# Patient Record
Sex: MALE | Race: WHITE | NOT HISPANIC OR LATINO | Employment: OTHER | URBAN - METROPOLITAN AREA
[De-identification: names, ages, dates, MRNs, and addresses within clinical notes are randomized per-mention and may not be internally consistent; named-entity substitution may affect disease eponyms.]

---

## 2018-07-31 ENCOUNTER — OFFICE VISIT (OUTPATIENT)
Dept: INTERNAL MEDICINE | Facility: MEDICAL CENTER | Age: 58
End: 2018-07-31
Payer: COMMERCIAL

## 2018-07-31 VITALS
WEIGHT: 315 LBS | TEMPERATURE: 97.6 F | BODY MASS INDEX: 45.1 KG/M2 | HEIGHT: 70 IN | RESPIRATION RATE: 18 BRPM | SYSTOLIC BLOOD PRESSURE: 144 MMHG | DIASTOLIC BLOOD PRESSURE: 90 MMHG | HEART RATE: 102 BPM | OXYGEN SATURATION: 96 %

## 2018-07-31 DIAGNOSIS — K21.9 GASTROESOPHAGEAL REFLUX DISEASE WITHOUT ESOPHAGITIS: ICD-10-CM

## 2018-07-31 DIAGNOSIS — G47.33 OBSTRUCTIVE SLEEP APNEA SYNDROME: ICD-10-CM

## 2018-07-31 DIAGNOSIS — I10 ESSENTIAL HYPERTENSION, BENIGN: ICD-10-CM

## 2018-07-31 DIAGNOSIS — E11.9 TYPE 2 DIABETES MELLITUS WITHOUT COMPLICATION, WITHOUT LONG-TERM CURRENT USE OF INSULIN (HCC): ICD-10-CM

## 2018-07-31 DIAGNOSIS — E66.01 CLASS 3 SEVERE OBESITY DUE TO EXCESS CALORIES WITHOUT SERIOUS COMORBIDITY WITH BODY MASS INDEX (BMI) OF 50.0 TO 59.9 IN ADULT (HCC): ICD-10-CM

## 2018-07-31 DIAGNOSIS — R41.3 MEMORY DYSFUNCTION: ICD-10-CM

## 2018-07-31 DIAGNOSIS — E78.5 DYSLIPIDEMIA: ICD-10-CM

## 2018-07-31 DIAGNOSIS — E66.01 MORBID OBESITY WITH BMI OF 50.0-59.9, ADULT (HCC): ICD-10-CM

## 2018-07-31 DIAGNOSIS — R07.89 OTHER CHEST PAIN: ICD-10-CM

## 2018-07-31 DIAGNOSIS — F41.9 ANXIETY: ICD-10-CM

## 2018-07-31 DIAGNOSIS — A05.1: ICD-10-CM

## 2018-07-31 PROBLEM — E66.9 OBESITY: Status: ACTIVE | Noted: 2018-07-31

## 2018-07-31 PROCEDURE — 99204 OFFICE O/P NEW MOD 45 MIN: CPT | Mod: GC | Performed by: INTERNAL MEDICINE

## 2018-07-31 RX ORDER — ESOMEPRAZOLE MAGNESIUM 40 MG/1
40 CAPSULE, DELAYED RELEASE ORAL
Qty: 90 CAP | Refills: 1 | Status: SHIPPED | OUTPATIENT
Start: 2018-07-31 | End: 2018-09-07 | Stop reason: SDUPTHER

## 2018-07-31 RX ORDER — ATORVASTATIN CALCIUM 20 MG/1
20 TABLET, FILM COATED ORAL NIGHTLY
COMMUNITY
End: 2018-09-07 | Stop reason: SDUPTHER

## 2018-07-31 RX ORDER — VALSARTAN AND HYDROCHLOROTHIAZIDE 320; 12.5 MG/1; MG/1
1 TABLET, FILM COATED ORAL DAILY
COMMUNITY
End: 2018-09-07 | Stop reason: SDUPTHER

## 2018-07-31 RX ORDER — FLUOXETINE HYDROCHLORIDE 40 MG/1
40 CAPSULE ORAL DAILY
COMMUNITY
Start: 2018-07-31 | End: 2018-09-07 | Stop reason: SDUPTHER

## 2018-07-31 ASSESSMENT — PAIN SCALES - GENERAL: PAINLEVEL: NO PAIN

## 2018-08-01 PROBLEM — E66.01 MORBID OBESITY WITH BMI OF 50.0-59.9, ADULT (HCC): Status: ACTIVE | Noted: 2018-08-01

## 2018-08-01 NOTE — PROGRESS NOTES
New Patient to Establish    Reason to establish: New patient to establish    CC: Hypertension, memory loss, diabetes mellitus type 2, dyslipidemia    HPI: Mrs. Harmon is a very pleasant 58-year-old male with past medical history significant for botulism back in 2007 had a very prolonged hospital course back then had a tracheostomy and PEG tube eventually recovered with significant memory deficits, hypertension, dyslipidemia, obstructive sleep apnea, dyslipidemia, diabetes mellitus type 2, GERD presents to the clinic to establish care after his previous primary care physician close down practice. His previous primary care physician was Dr. Georgette Madsen.    The patient states that he is doing fine, has no complaints but during interaction with patient, he is clearly having memory issues. Discussed with wife, patient is unable to leave home secondary to anxiety from his memory issues. Dr. Rodriguez evaluated him at that time and noted him to have severe memory loss for both verbal and nonverbal information. 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    His diabetes mellitus type 2, patient's wife reports that his last A1c was done in January which was labs were done in January and at that time his A1c was between 7 and 8. She is not aware of any otherwise abnormal labs.      Patient Active Problem List    Diagnosis Date Noted   • GERD (gastroesophageal reflux disease) 07/31/2018   • Type 2 diabetes mellitus (HCC) 12/04/2012   • Dyslipidemia 12/04/2012   • Essential hypertension, benign 12/04/2012   • Memory dysfunction 12/04/2012   • Botulism 12/04/2012   • Sleep apnea 12/04/2012       Past Medical History:   Diagnosis Date   • Diabetes    • Hyperlipidemia    • Hypertension        Current Outpatient Prescriptions   Medication Sig Dispense Refill   • valsartan-hydrochlorothiazide (DIOVAN-HCT) 320-12.5 MG per tablet Take 1 Tab by mouth every  day.     • linagliptin (TRADJENTA) 5 MG Tab tablet Take 5 mg by mouth every day.     • atorvastatin (LIPITOR) 20 MG Tab Take 20 mg by mouth every evening.     • metformin (GLUCOPHAGE) 1000 MG tablet Take 1 Tab by mouth 2 times a day, with meals. 180 Tab 1   • esomeprazole (NEXIUM) 40 MG delayed-release capsule Take 1 Cap by mouth every morning before breakfast. 90 Cap 1   • fluoxetine (PROZAC) 20 MG CAPS Take 20 mg by mouth every day.         No current facility-administered medications for this visit.        Allergies as of 07/31/2018   • (No Known Allergies)       Social History     Social History   • Marital status:      Spouse name: N/A   • Number of children: N/A   • Years of education: N/A     Occupational History   • Not on file.     Social History Main Topics   • Smoking status: Former Smoker     Packs/day: 0.50     Types: Cigarettes     Quit date: 12/4/1980   • Smokeless tobacco: Former User     Types: Chew     Quit date: 12/4/1980      Comment: NOT REALLY SURE WHAT YEAR HE QUIT SMOKING & CHEW   • Alcohol use No   • Drug use: Yes     Types: Marijuana   • Sexual activity: Not on file     Other Topics Concern   • Not on file     Social History Narrative   • No narrative on file       Family History   Problem Relation Age of Onset   • Stroke Mother    • Heart Disease Father    • Cancer Neg Hx        Past Surgical History:   Procedure Laterality Date   • TRACHEOSTOMY         ROS: As per HPI. Additional pertinent symptoms as noted below.    Constitutional: Denies fevers, Denies weight changes  Eyes: Denies changes in vision, no eye pain  Ears/Nose/Throat/Mouth: Denies nasal congestion or sore throat   Cardiovascular: Denies chest pain or palpitations   Respiratory: Denies shortness of breath , Denies cough  Gastrointestinal/Hepatic: Denies abdominal pain, nausea, vomiting, diarrhea, constipation or GI bleeding   Genitourinary: Denies bladder dysfunction, dysuria or frequency  Musculoskeletal/Rheum: Denies  " joint pain and swelling   Neurological: Denies headache, confusion, memory loss or focal weakness/parasthesias  Psychiatric: Reports mood disorder   Endocrine: Reports history of diabetes, denies thyroid disorder  Heme/Oncology/Lymph Nodes: Denies enlarged lymph nodes, denies brusing or known bleeding disorder  Allergic/Immunologic: Denies hx of allergies            /90   Pulse (!) 102   Temp 36.4 °C (97.6 °F)   Resp 18   Ht 1.79 m (5' 10.47\")   Wt (!) 162.6 kg (358 lb 6.4 oz)   SpO2 96%   BMI 50.74 kg/m²     Physical Exam  General:  Alert and oriented, No apparent distress.    Eyes: Pupils equal and reactive. No scleral icterus.    Throat: Clear no erythema or exudates noted.    Neck: Supple. No lymphadenopathy noted. Thyroid not enlarged.    Lungs: Clear to auscultation and percussion bilaterally.    Cardiovascular: Regular rate and rhythm. No murmurs, rubs or gallops.    Abdomen:  Benign. No rebound or guarding noted.    Extremities: No clubbing, cyanosis, edema.    Skin: Clear. No rash or suspicious skin lesions noted.      Note: I have reviewed all pertinent labs and diagnostic tests associated with this visit with specific comments listed under the assessment and plan below    Assessment and Plan    1. Type 2 diabetes mellitus without complication, without long-term current use of insulin (HCC)  Currently using metformin and linagliptin.  Appears to be well controlled based on A1c which was reportedly to be between 7-8.  Will get repeat labs, continue current medication.  We'll need ophthalmology follow-up and foot exam during next visit.  - BASIC METABOLIC PANEL; Future  - HEMOGLOBIN A1C; Future  - MICROALBUMIN CREAT RATIO URINE; Future  - metformin (GLUCOPHAGE) 1000 MG tablet; Take 1 Tab by mouth 2 times a day, with meals.  Dispense: 180 Tab; Refill: 1    2. Obstructive sleep apnea syndrome  Has a history of obstructive sleep apnea diagnosed previously.  Try using CPAP in the past, was not able " to tolerate it.  Strongly counseled patient about benefits of using CPAP.  We'll refer to pulmonology, which patient will follow up with.  - CBC WITH DIFFERENTIAL; Future  - REFERRAL TO PULMONOLOGY    3. Dyslipidemia  Continue atorvastatin 20 mg daily for now.  We'll recheck lipid panel.  - LIPID PROFILE; Future    4. Gastroesophageal reflux disease without esophagitis  Currently well-controlled.  Refill Nexium.  - esomeprazole (NEXIUM) 40 MG delayed-release capsule; Take 1 Cap by mouth every morning before breakfast.  Dispense: 90 Cap; Refill: 1    5. Essential hypertension, benign  Slightly elevated at 144/90.  We'll continue current medication which is valsartan hydro-chlorothiazide, for now.  Patient reports blood pressure to be elevated due to anxiety from office visit.  Advised ambulatory home blood pressure monitoring.    6. Memory dysfunction  Patient has significant memory difficulties.  Evaluated by Dr. Rodriguez in 2013 January.  No wander risk at the moment.  Did not see the possibility of memory returning after 11 years from the incident.  Encourage more activities.    7. Anxiety  Likely as result of his memory issues.  's dose of fluoxetine to 40 mg daily.  We'll reassess during next visit, as it takes 4-6 weeks for a SSRI take effect.    8. Healthcare maintenance  Colonoscopy done in September 2017, repeat colonoscopy recommended in 3 years for polyps.  Will need to get records from PCPs office for vaccine records.        Followup: Return in about 5 weeks (around 9/4/2018).      Signed by: Wilber Turner M.D.

## 2018-08-24 LAB
ALBUMIN/CREAT UR: 50.2 MG/G CREAT (ref 0–30)
BASOPHILS # BLD AUTO: 0 X10E3/UL (ref 0–0.2)
BASOPHILS NFR BLD AUTO: 0 %
BUN SERPL-MCNC: 13 MG/DL (ref 6–24)
BUN/CREAT SERPL: 14 (ref 9–20)
CALCIUM SERPL-MCNC: 9.8 MG/DL (ref 8.7–10.2)
CHLORIDE SERPL-SCNC: 103 MMOL/L (ref 96–106)
CHOLEST SERPL-MCNC: 164 MG/DL (ref 100–199)
CO2 SERPL-SCNC: 23 MMOL/L (ref 20–29)
CREAT SERPL-MCNC: 0.9 MG/DL (ref 0.76–1.27)
CREAT UR-MCNC: 234.5 MG/DL
EOSINOPHIL # BLD AUTO: 0.2 X10E3/UL (ref 0–0.4)
EOSINOPHIL NFR BLD AUTO: 2 %
ERYTHROCYTE [DISTWIDTH] IN BLOOD BY AUTOMATED COUNT: 14.2 % (ref 12.3–15.4)
GLUCOSE SERPL-MCNC: 139 MG/DL (ref 65–99)
HBA1C MFR BLD: 6.8 % (ref 4.8–5.6)
HCT VFR BLD AUTO: 45.8 % (ref 37.5–51)
HDLC SERPL-MCNC: 49 MG/DL
HGB BLD-MCNC: 15.6 G/DL (ref 13–17.7)
IF AFRICAN AMERICAN  100797: 110 ML/MIN/1.73
IF NON AFRICAN AMER 100791: 95 ML/MIN/1.73
IMM GRANULOCYTES # BLD: 0 X10E3/UL (ref 0–0.1)
IMM GRANULOCYTES NFR BLD: 0 %
IMMATURE CELLS  115398: ABNORMAL
LABORATORY COMMENT REPORT: NORMAL
LDLC SERPL CALC-MCNC: 88 MG/DL (ref 0–99)
LYMPHOCYTES # BLD AUTO: 2.3 X10E3/UL (ref 0.7–3.1)
LYMPHOCYTES NFR BLD AUTO: 23 %
MCH RBC QN AUTO: 29.2 PG (ref 26.6–33)
MCHC RBC AUTO-ENTMCNC: 34.1 G/DL (ref 31.5–35.7)
MCV RBC AUTO: 86 FL (ref 79–97)
MICROALBUMIN UR-MCNC: 117.7 UG/ML
MONOCYTES # BLD AUTO: 1.1 X10E3/UL (ref 0.1–0.9)
MONOCYTES NFR BLD AUTO: 11 %
MORPHOLOGY BLD-IMP: ABNORMAL
NEUTROPHILS # BLD AUTO: 6.7 X10E3/UL (ref 1.4–7)
NEUTROPHILS NFR BLD AUTO: 64 %
NRBC BLD AUTO-RTO: ABNORMAL %
PLATELET # BLD AUTO: 240 X10E3/UL (ref 150–379)
POTASSIUM SERPL-SCNC: 4.5 MMOL/L (ref 3.5–5.2)
RBC # BLD AUTO: 5.35 X10E6/UL (ref 4.14–5.8)
SODIUM SERPL-SCNC: 142 MMOL/L (ref 134–144)
TRIGL SERPL-MCNC: 136 MG/DL (ref 0–149)
VLDLC SERPL CALC-MCNC: 27 MG/DL (ref 5–40)
WBC # BLD AUTO: 10.3 X10E3/UL (ref 3.4–10.8)

## 2018-09-06 ENCOUNTER — APPOINTMENT (OUTPATIENT)
Dept: INTERNAL MEDICINE | Facility: MEDICAL CENTER | Age: 58
End: 2018-09-06
Payer: COMMERCIAL

## 2018-09-07 ENCOUNTER — OFFICE VISIT (OUTPATIENT)
Dept: INTERNAL MEDICINE | Facility: MEDICAL CENTER | Age: 58
End: 2018-09-07
Payer: COMMERCIAL

## 2018-09-07 VITALS
RESPIRATION RATE: 19 BRPM | HEIGHT: 70 IN | DIASTOLIC BLOOD PRESSURE: 90 MMHG | HEART RATE: 88 BPM | WEIGHT: 315 LBS | OXYGEN SATURATION: 94 % | TEMPERATURE: 97.6 F | SYSTOLIC BLOOD PRESSURE: 148 MMHG | BODY MASS INDEX: 45.1 KG/M2

## 2018-09-07 DIAGNOSIS — E78.5 DYSLIPIDEMIA: ICD-10-CM

## 2018-09-07 DIAGNOSIS — E11.9 TYPE 2 DIABETES MELLITUS WITHOUT COMPLICATION, WITHOUT LONG-TERM CURRENT USE OF INSULIN (HCC): ICD-10-CM

## 2018-09-07 DIAGNOSIS — E66.01 MORBID OBESITY WITH BMI OF 50.0-59.9, ADULT (HCC): ICD-10-CM

## 2018-09-07 DIAGNOSIS — R07.89 OTHER CHEST PAIN: ICD-10-CM

## 2018-09-07 DIAGNOSIS — I10 ESSENTIAL HYPERTENSION, BENIGN: ICD-10-CM

## 2018-09-07 DIAGNOSIS — F41.9 ANXIETY: ICD-10-CM

## 2018-09-07 DIAGNOSIS — R41.3 MEMORY DYSFUNCTION: ICD-10-CM

## 2018-09-07 DIAGNOSIS — I10 HYPERTENSION, UNSPECIFIED TYPE: ICD-10-CM

## 2018-09-07 DIAGNOSIS — R80.9 MICROALBUMINURIA: ICD-10-CM

## 2018-09-07 DIAGNOSIS — A05.1: ICD-10-CM

## 2018-09-07 DIAGNOSIS — Z23 FLU VACCINE NEED: ICD-10-CM

## 2018-09-07 DIAGNOSIS — K21.9 GASTROESOPHAGEAL REFLUX DISEASE WITHOUT ESOPHAGITIS: ICD-10-CM

## 2018-09-07 PROCEDURE — 99214 OFFICE O/P EST MOD 30 MIN: CPT | Mod: GC,25 | Performed by: INTERNAL MEDICINE

## 2018-09-07 PROCEDURE — 90471 IMMUNIZATION ADMIN: CPT | Performed by: INTERNAL MEDICINE

## 2018-09-07 PROCEDURE — 90686 IIV4 VACC NO PRSV 0.5 ML IM: CPT | Performed by: INTERNAL MEDICINE

## 2018-09-07 RX ORDER — FLUOXETINE HYDROCHLORIDE 40 MG/1
40 CAPSULE ORAL DAILY
Qty: 90 CAP | Refills: 2 | Status: SHIPPED | OUTPATIENT
Start: 2018-09-07 | End: 2019-01-25 | Stop reason: SDUPTHER

## 2018-09-07 RX ORDER — VALSARTAN AND HYDROCHLOROTHIAZIDE 320; 12.5 MG/1; MG/1
1 TABLET, FILM COATED ORAL DAILY
Qty: 30 TAB | Refills: 0 | Status: SHIPPED | OUTPATIENT
Start: 2018-09-07 | End: 2018-09-07 | Stop reason: SDUPTHER

## 2018-09-07 RX ORDER — VALSARTAN AND HYDROCHLOROTHIAZIDE 320; 12.5 MG/1; MG/1
1 TABLET, FILM COATED ORAL DAILY
Qty: 30 TAB | Refills: 0 | Status: SHIPPED | OUTPATIENT
Start: 2018-09-07 | End: 2019-01-25 | Stop reason: SDUPTHER

## 2018-09-07 RX ORDER — ESOMEPRAZOLE MAGNESIUM 40 MG/1
40 CAPSULE, DELAYED RELEASE ORAL
Qty: 90 CAP | Refills: 2 | Status: SHIPPED | OUTPATIENT
Start: 2018-09-07 | End: 2019-01-25 | Stop reason: SDUPTHER

## 2018-09-07 RX ORDER — VALSARTAN AND HYDROCHLOROTHIAZIDE 320; 12.5 MG/1; MG/1
1 TABLET, FILM COATED ORAL DAILY
Qty: 90 TAB | Refills: 2 | Status: SHIPPED | OUTPATIENT
Start: 2018-09-07 | End: 2018-09-07 | Stop reason: SDUPTHER

## 2018-09-07 RX ORDER — ATORVASTATIN CALCIUM 20 MG/1
20 TABLET, FILM COATED ORAL DAILY
Qty: 90 TAB | Refills: 2 | Status: SHIPPED | OUTPATIENT
Start: 2018-09-07 | End: 2019-01-25 | Stop reason: SDUPTHER

## 2018-09-07 ASSESSMENT — PAIN SCALES - GENERAL: PAINLEVEL: NO PAIN

## 2018-09-07 NOTE — PATIENT INSTRUCTIONS
Please measure blood pressure at home and maintain recordings.    Please follow up with dentist and pulmonology for sleep apnea.    Diabetes Mellitus and Food  It is important for you to manage your blood sugar (glucose) level. Your blood glucose level can be greatly affected by what you eat. Eating healthier foods in the appropriate amounts throughout the day at about the same time each day will help you control your blood glucose level. It can also help slow or prevent worsening of your diabetes mellitus. Healthy eating may even help you improve the level of your blood pressure and reach or maintain a healthy weight.  General recommendations for healthful eating and cooking habits include:  · Eating meals and snacks regularly. Avoid going long periods of time without eating to lose weight.  · Eating a diet that consists mainly of plant-based foods, such as fruits, vegetables, nuts, legumes, and whole grains.  · Using low-heat cooking methods, such as baking, instead of high-heat cooking methods, such as deep frying.  Work with your dietitian to make sure you understand how to use the Nutrition Facts information on food labels.  How can food affect me?  Carbohydrates   Carbohydrates affect your blood glucose level more than any other type of food. Your dietitian will help you determine how many carbohydrates to eat at each meal and teach you how to count carbohydrates. Counting carbohydrates is important to keep your blood glucose at a healthy level, especially if you are using insulin or taking certain medicines for diabetes mellitus.  Alcohol   Alcohol can cause sudden decreases in blood glucose (hypoglycemia), especially if you use insulin or take certain medicines for diabetes mellitus. Hypoglycemia can be a life-threatening condition. Symptoms of hypoglycemia (sleepiness, dizziness, and disorientation) are similar to symptoms of having too much alcohol.  If your health care provider has given you approval to  drink alcohol, do so in moderation and use the following guidelines:  · Women should not have more than one drink per day, and men should not have more than two drinks per day. One drink is equal to:  ¨ 12 oz of beer.  ¨ 5 oz of wine.  ¨ 1½ oz of hard liquor.  · Do not drink on an empty stomach.  · Keep yourself hydrated. Have water, diet soda, or unsweetened iced tea.  · Regular soda, juice, and other mixers might contain a lot of carbohydrates and should be counted.  What foods are not recommended?  As you make food choices, it is important to remember that all foods are not the same. Some foods have fewer nutrients per serving than other foods, even though they might have the same number of calories or carbohydrates. It is difficult to get your body what it needs when you eat foods with fewer nutrients. Examples of foods that you should avoid that are high in calories and carbohydrates but low in nutrients include:  · Trans fats (most processed foods list trans fats on the Nutrition Facts label).  · Regular soda.  · Juice.  · Candy.  · Sweets, such as cake, pie, doughnuts, and cookies.  · Fried foods.  What foods can I eat?  Eat nutrient-rich foods, which will nourish your body and keep you healthy. The food you should eat also will depend on several factors, including:  · The calories you need.  · The medicines you take.  · Your weight.  · Your blood glucose level.  · Your blood pressure level.  · Your cholesterol level.  You should eat a variety of foods, including:  · Protein.  ¨ Lean cuts of meat.  ¨ Proteins low in saturated fats, such as fish, egg whites, and beans. Avoid processed meats.  · Fruits and vegetables.  ¨ Fruits and vegetables that may help control blood glucose levels, such as apples, mangoes, and yams.  · Dairy products.  ¨ Choose fat-free or low-fat dairy products, such as milk, yogurt, and cheese.  · Grains, bread, pasta, and rice.  ¨ Choose whole grain products, such as multigrain bread,  whole oats, and brown rice. These foods may help control blood pressure.  · Fats.  ¨ Foods containing healthful fats, such as nuts, avocado, olive oil, canola oil, and fish.  Does everyone with diabetes mellitus have the same meal plan?  Because every person with diabetes mellitus is different, there is not one meal plan that works for everyone. It is very important that you meet with a dietitian who will help you create a meal plan that is just right for you.  This information is not intended to replace advice given to you by your health care provider. Make sure you discuss any questions you have with your health care provider.  Document Released: 09/14/2006 Document Revised: 05/25/2017 Document Reviewed: 11/14/2014  [employer] Patient Education © 2017 [employer]

## 2018-09-07 NOTE — PROGRESS NOTES
Ramirez Harmon is a 58 y.o. male here for a non-provider visit for: INFLUENZA VACCINE      Reason for immunization: Annual Flu Vaccine  Immunization records indicate need for vaccine: Yes, confirmed with [facility]  Minimum interval has been met for this vaccine: Yes  ABN completed: Not Indicated    Order and dose verified by: JULIANO GOSS Dated  08/07/2015 was given to patient: Yes  IAC Questionnaire abnormal.  Questionnaire reviewed and administration of injection approved by provider: DAVE    Patient tolerated injection and no adverse effects were observed or reported: Yes    Pt scheduled for next dose in series: Not Indicated

## 2018-09-07 NOTE — TELEPHONE ENCOUNTER
1. Caller Name: Tina from [facility]                      Call Back Number: [phone]    2. Message: Tina called and l/m. Pt needs refills on all 6 medications sent to [facility](90 day supply). Also pt's rx: Valsartan-Hctz. Pt only has 2 days left. If we can send a short day supply to [facility], but also [facility]     3. Patient approves office to leave a detailed voicemail/MyChart message: N\A

## 2018-09-08 NOTE — PROGRESS NOTES
Established Patient    Ramirez presents today with the following:    CC: Hypertension; DM type 2; Dyslipidemia    HPI: Mr. Harmon is a very pleasant 58-year-old male with past medical history significant for botulism back in 2007 had a very prolonged hospital course back then had a tracheostomy and PEG tube eventually recovered with significant memory deficits, hypertension, dyslipidemia, obstructive sleep apnea, dyslipidemia, diabetes mellitus type 2, GERD presents to the clinic for follow up visit.    He reports his mood to be fine, no issues. His wife however did not accompany him today as she is out of town. He himself states that he eats healthy but previously his wife stated that he did eat fast food quite a bit. He reports to be doing fine without his wife at the moment. There is someone checking on him every day.     The patient states that he is doing fine, has no complaints but during interaction with patient, he is clearly having memory issues. Dr. Rodriguez evaluated him in 2012 and noted him to have severe memory loss for both verbal and nonverbal information. 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     His diabetes mellitus type 2, patient's wife reports that his last A1c was done recently and is at 6.8 and well controlled.      Patient Active Problem List    Diagnosis Date Noted   • Microalbuminuria 09/07/2018   • Morbid obesity with BMI of 50.0-59.9, adult ([facility]) 08/01/2018   • GERD (gastroesophageal reflux disease) 07/31/2018   • Obesity 07/31/2018   • Anxiety 07/31/2018   • Type 2 diabetes mellitus (HCC) 12/04/2012   • Dyslipidemia 12/04/2012   • Essential hypertension, benign 12/04/2012   • Memory dysfunction 12/04/2012   • Botulism 12/04/2012   • Sleep apnea 12/04/2012       Current Outpatient Prescriptions   Medication Sig Dispense Refill   • atorvastatin (LIPITOR) 20 MG Tab Take 1 Tab by mouth every day. 90 Tab 2   •  esomeprazole (NEXIUM) 40 MG delayed-release capsule Take 1 Cap by mouth every morning before breakfast. 90 Cap 2   • fluoxetine (PROZAC) 40 MG capsule Take 1 Cap by mouth every day. 90 Cap 2   • linagliptin (TRADJENTA) 5 MG Tab tablet Take 1 Tab by mouth every day. 90 Tab 2   • metformin (GLUCOPHAGE) 1000 MG tablet Take 1 Tab by mouth 2 times a day, with meals. 180 Tab 2   • valsartan-hydrochlorothiazide (DIOVAN-HCT) 320-12.5 MG per tablet Take 1 Tab by mouth every day. 30 Tab 0     No current facility-administered medications for this visit.        Social History     Social History   • Marital status:      Spouse name: N/A   • Number of children: N/A   • Years of education: N/A     Occupational History   • Not on file.     Social History Main Topics   • Smoking status: Former Smoker     Packs/day: 0.50     Types: Cigarettes     Quit date: 12/4/1980   • Smokeless tobacco: Former User     Types: Chew     Quit date: 12/4/1980      Comment: NOT REALLY SURE WHAT YEAR HE QUIT SMOKING & CHEW   • Alcohol use No   • Drug use: Yes     Types: Marijuana   • Sexual activity: Not on file     Other Topics Concern   • Not on file     Social History Narrative   • No narrative on file       Family History   Problem Relation Age of Onset   • Stroke Mother    • Heart Disease Father    • Cancer Neg Hx        ROS: As per HPI. Additional pertinent symptoms as noted below.    Constitutional: Denies fevers, Denies weight changes  Eyes: Denies changes in vision, no eye pain  Ears/Nose/Throat/Mouth: Denies nasal congestion or sore throat   Cardiovascular: Denies chest pain or palpitations   Respiratory: Denies shortness of breath , Denies cough  Gastrointestinal/Hepatic: Denies abdominal pain, nausea, vomiting, diarrhea, constipation or GI bleeding   Genitourinary: Denies bladder dysfunction, dysuria or frequency  Musculoskeletal/Rheum: Denies  joint pain and swelling   Neurological: Denies headache, confusion, memory loss or focal  "weakness/parasthesias  Psychiatric: Reports mood disorder   Endocrine: Reports history of diabetes, denies thyroid disorder  Heme/Oncology/Lymph Nodes: Denies enlarged lymph nodes, denies brusing or known bleeding disorder  Allergic/Immunologic: Denies hx of allergies        /90 Comment: repeated bp  Pulse 88   Temp 36.4 °C (97.6 °F)   Resp 19   Ht 1.79 m (5' 10.47\")   Wt (!) 162.3 kg (357 lb 12.8 oz)   SpO2 94%   BMI 50.66 kg/m²     Physical Exam  General:  Alert and oriented, No apparent distress.    Eyes: Pupils equal and reactive. No scleral icterus.    Throat: Clear no erythema or exudates noted.    Neck: Supple. No lymphadenopathy noted. Thyroid not enlarged.    Lungs: Clear to auscultation and percussion bilaterally.    Cardiovascular: Regular rate and rhythm. No murmurs, rubs or gallops.    Abdomen:  Benign. No rebound or guarding noted.    Extremities: No clubbing, cyanosis, edema.    Skin: Clear. No rash or suspicious skin lesions noted.        Note: I have reviewed all pertinent labs and diagnostic tests associated with this visit with specific comments listed under the assessment and plan below      Assessment and Plan    1. Essential hypertension, benign  Elevated at visit, somewhat better on second measurement.  He reports that he is very anxious at these appointments and it is always high in clinic.  He states that it is more relaxed at home.  Advised to check ambulatory blood pressure recordings and gave it in writing so wife can follow up.    2. Anxiety  Likely as result of his memory issues.  Taking fluoxetine 40 mg daily.  Discussed counseling but patient states that he is doing fine.  Will follow up next visit, ideally his caretaker (wife) also needs to be here.    3. Morbid obesity with BMI of 50.0-59.9, adult ([facility])  Counseled patient about diet and lifestyle measures.  He is aware and will try to take his dogs for a walk around the [facility].    4. Type 2 diabetes mellitus without " complication, without long-term current use of insulin (HCC)  Currently using metformin and linagliptin.  Appears to be well controlled with A1C at 6.8  Continue current regimen, deintensify therapy if A1C reaches 6.5  Referred to nutritionist.  We'll need ophthalmology follow-up and foot exam during next visit.    5. Flu vaccine need  Influenza vaccine given.    6. Microalbuminuria  Likely secondary to diabetes.  On maximum dose of valsartan.  Continue to monitor every 6 months - 1 year.    7. Obstructive sleep apnea syndrome  Has a history of obstructive sleep apnea diagnosed previously.  Try using CPAP in the past, was not able to tolerate it.  Strongly counseled patient about benefits of using CPAP.  Advised follow up to pulmonology which he was referred to in previous visit.     8. Dyslipidemia  Continue atorvastatin 20 mg daily for now.  Lipid panel in normal limitis.     9. Gastroesophageal reflux disease without esophagitis  Currently well-controlled.  Refill Nexium.    10. Memory dysfunction  Patient has significant memory difficulties.  Evaluated by Dr. Rodriguez in 2013 January.  No wander risk at the moment.  Did not see the possibility of memory returning after 11 years from the incident.  Encourage more activities.     11. Healthcare maintenance  Colonoscopy done in September 2017, repeat colonoscopy recommended in 3 years for polyps.  Will need to get records from PCPs office for vaccine records.  Flu vaccine given.        Followup: Return in about 10 weeks (around 11/16/2018), or if symptoms worsen or fail to improve.      Signed by: Wilber Turner M.D.

## 2018-11-16 ENCOUNTER — OFFICE VISIT (OUTPATIENT)
Dept: INTERNAL MEDICINE | Facility: MEDICAL CENTER | Age: 58
End: 2018-11-16
Payer: COMMERCIAL

## 2018-11-16 VITALS
HEIGHT: 70 IN | SYSTOLIC BLOOD PRESSURE: 106 MMHG | OXYGEN SATURATION: 98 % | DIASTOLIC BLOOD PRESSURE: 86 MMHG | BODY MASS INDEX: 45.1 KG/M2 | WEIGHT: 315 LBS | TEMPERATURE: 98.6 F | HEART RATE: 99 BPM

## 2018-11-16 DIAGNOSIS — I10 ESSENTIAL HYPERTENSION, BENIGN: ICD-10-CM

## 2018-11-16 DIAGNOSIS — E66.01 CLASS 3 SEVERE OBESITY DUE TO EXCESS CALORIES WITHOUT SERIOUS COMORBIDITY WITH BODY MASS INDEX (BMI) OF 50.0 TO 59.9 IN ADULT (HCC): ICD-10-CM

## 2018-11-16 DIAGNOSIS — E11.9 TYPE 2 DIABETES MELLITUS WITHOUT COMPLICATION, WITHOUT LONG-TERM CURRENT USE OF INSULIN (HCC): ICD-10-CM

## 2018-11-16 DIAGNOSIS — R41.3 MEMORY DYSFUNCTION: ICD-10-CM

## 2018-11-16 PROCEDURE — 99214 OFFICE O/P EST MOD 30 MIN: CPT | Mod: GC | Performed by: INTERNAL MEDICINE

## 2018-11-16 ASSESSMENT — PATIENT HEALTH QUESTIONNAIRE - PHQ9: CLINICAL INTERPRETATION OF PHQ2 SCORE: 0

## 2018-11-16 NOTE — PROGRESS NOTES
Established Patient    Ramirez presents today with the following:    CC: Hypertension, DM type 2, Obesity    HPI: Mr. Harmon is a very pleasant 58-year-old male with past medical history significant for botulism back in 2007 had a very prolonged hospital course back then had a tracheostomy and PEG tube eventually recovered with significant memory deficits, hypertension, dyslipidemia, obstructive sleep apnea, dyslipidemia, diabetes mellitus type 2, GERD presents to the clinic for follow up visit regarding hypertension, DM type 2,    He is here with his wife today and is in a pleasant mood. Recently he reports that he has changed his diet, supervised by his wife and he has now lost 22 lbs. He is eating a lot less sugar. He is also trying to exercise but has not yet exercised recently.    His mood is stable and no issues. He does have significant memory impairment with his history of botulism which limits his life but he he is learning to cope with it. Dr. Rodriguez evaluated him in 2012 and noted him to have severe memory loss for both verbal and nonverbal information. 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    He was previously referred to pulmonology for obstructive sleep apnea which he states that he has not followed up yet. Patient's wife who helps him with his medical condition is going to make an appointment for him.    His last A1c is 6.8 and he is now wondering if we could stop his diabetic medication at some point.      Patient Active Problem List    Diagnosis Date Noted   • Microalbuminuria 09/07/2018   • Morbid obesity with BMI of 50.0-59.9, adult ([facility]) 08/01/2018   • GERD (gastroesophageal reflux disease) 07/31/2018   • Obesity 07/31/2018   • Anxiety 07/31/2018   • Type 2 diabetes mellitus (HCC) 12/04/2012   • Dyslipidemia 12/04/2012   • Essential hypertension, benign 12/04/2012   • Memory dysfunction 12/04/2012   • Botulism  12/04/2012   • Sleep apnea 12/04/2012       Current Outpatient Prescriptions   Medication Sig Dispense Refill   • atorvastatin (LIPITOR) 20 MG Tab Take 1 Tab by mouth every day. 90 Tab 2   • esomeprazole (NEXIUM) 40 MG delayed-release capsule Take 1 Cap by mouth every morning before breakfast. 90 Cap 2   • fluoxetine (PROZAC) 40 MG capsule Take 1 Cap by mouth every day. 90 Cap 2   • linagliptin (TRADJENTA) 5 MG Tab tablet Take 1 Tab by mouth every day. 90 Tab 2   • metformin (GLUCOPHAGE) 1000 MG tablet Take 1 Tab by mouth 2 times a day, with meals. 180 Tab 2   • valsartan-hydrochlorothiazide (DIOVAN-HCT) 320-12.5 MG per tablet Take 1 Tab by mouth every day. 30 Tab 0     No current facility-administered medications for this visit.        Social History     Social History   • Marital status:      Spouse name: N/A   • Number of children: N/A   • Years of education: N/A     Occupational History   • Not on file.     Social History Main Topics   • Smoking status: Former Smoker     Packs/day: 0.50     Types: Cigarettes     Quit date: 12/4/1980   • Smokeless tobacco: Former User     Types: Chew     Quit date: 12/4/1980      Comment: NOT REALLY SURE WHAT YEAR HE QUIT SMOKING & CHEW   • Alcohol use No   • Drug use: Yes     Types: Marijuana   • Sexual activity: Not on file     Other Topics Concern   • Not on file     Social History Narrative   • No narrative on file       Family History   Problem Relation Age of Onset   • Stroke Mother    • Heart Disease Father    • Cancer Neg Hx        ROS: As per HPI. Additional pertinent systems as noted below.    Constitutional: Denies fevers, Denies weight changes  Eyes: Denies changes in vision, no eye pain  Ears/Nose/Throat/Mouth: Denies nasal congestion or sore throat   Cardiovascular: Denies chest pain or palpitations   Respiratory: Denies shortness of breath , Denies cough  Gastrointestinal/Hepatic: Denies abdominal pain, nausea, vomiting, diarrhea, constipation or GI bleeding  "  Genitourinary: Denies bladder dysfunction, dysuria or frequency  Musculoskeletal/Rheum: Denies  joint pain and swelling   Skin/Breast: Denies rash, denies breast lumps or discharge  Neurological: Denies headache, confusion, memory loss or focal weakness/parasthesias  Psychiatric: denies mood disorder   Endocrine: denies hx of diabetes or thyroid dysfunction  Heme/Oncology/Lymph Nodes: Denies enlarged lymph nodes, denies brusing or known bleeding disorder  Allergic/Immunologic: Denies hx of allergies      All other systems were reviewed and are negative (AMA/CMS criteria)      /86 (BP Location: Left arm, Patient Position: Sitting, BP Cuff Size: Large adult long)   Pulse 99   Temp 37 °C (98.6 °F)   Ht 1.789 m (5' 10.45\")   Wt (!) 152 kg (335 lb)   SpO2 98%   BMI 47.46 kg/m²     Physical Exam  General:  Alert and oriented, No apparent distress.    Eyes: Pupils equal and reactive. No scleral icterus.    Throat: Clear no erythema or exudates noted.    Neck: Supple. No lymphadenopathy noted. Thyroid not enlarged.    Lungs: Clear to auscultation and percussion bilaterally.    Cardiovascular: Regular rate and rhythm. No murmurs, rubs or gallops.    Abdomen:  Benign. No rebound or guarding noted.    Extremities: No clubbing, cyanosis, edema.    Skin: Clear. No rash or suspicious skin lesions noted.        Note: I have reviewed all pertinent labs and diagnostic tests associated with this visit with specific comments listed under the assessment and plan below      Assessment and Plan    1. Essential hypertension, benign  Under significantly better control.  Likely secondary to weight loss.  Encouraged patient to have further weight loss.    2. Type 2 diabetes mellitus without complication, without long-term current use of insulin (HCC)  Last A1c was well controlled at 6.8.  Continue metformin and lingagliptin.  If A1C improves next time, can stop linaglipitin.  Follows nutritionist.  - HEMOGLOBIN A1C; " Future    3. Class 3 severe obesity due to excess calories without serious comorbidity with body mass index (BMI) of 50.0 to 59.9 in adult ([facility])  Counseled patient about continuing diet and lifestyle measures.  He is aware and will try to take his dogs for a walk around the [facility]    4. Memory dysfunction  Patient has significant memory difficulties.  Evaluated by Dr. Rodriguez in 2013 January.  No wander risk at the moment.  Did not see the possibility of memory returning after 11 years from the incident. He is currently unable to work because of this.  Encourage more activities.    5. Anxiety  Likely as result of his memory issues.  Taking fluoxetine 40 mg daily.  Has somewhat of a flat affect, but no warning signs.    6. Microalbuminuria  Likely secondary to diabetes.  On maximum dose of valsartan.  Continue to monitor every 6 months - 1 year.    7. Obstructive sleep apnea syndrome  Has a history of obstructive sleep apnea diagnosed previously.  Referred to pulmonology on previous visit.  Patient's wife will make appointment for him.     8. Dyslipidemia  Continue atorvastatin 20 mg daily for now.  Lipid panel in normal limitis.     9. Gastroesophageal reflux disease without esophagitis  Currently well-controlled.  Continue Nexium.    10. Healthcare maintenance  Colonoscopy done in September 2017, repeat colonoscopy recommended in 3 years for polyps.  Will need to get records from PCPs office for vaccine records.  Flu vaccine given.        Followup: Return in about 15 weeks (around 3/1/2019), or if symptoms worsen or fail to improve.      Signed by: Wilber Turner M.D.

## 2018-11-16 NOTE — PATIENT INSTRUCTIONS
[facility] - Pulmonary Medicine   [address] [place] [address]  [phone]

## 2019-01-25 ENCOUNTER — OFFICE VISIT (OUTPATIENT)
Dept: INTERNAL MEDICINE | Facility: MEDICAL CENTER | Age: 59
End: 2019-01-25
Payer: COMMERCIAL

## 2019-01-25 VITALS
SYSTOLIC BLOOD PRESSURE: 120 MMHG | TEMPERATURE: 96.2 F | OXYGEN SATURATION: 97 % | HEART RATE: 96 BPM | BODY MASS INDEX: 44.1 KG/M2 | WEIGHT: 315 LBS | DIASTOLIC BLOOD PRESSURE: 70 MMHG | HEIGHT: 71 IN

## 2019-01-25 DIAGNOSIS — K21.9 GASTROESOPHAGEAL REFLUX DISEASE WITHOUT ESOPHAGITIS: ICD-10-CM

## 2019-01-25 DIAGNOSIS — E11.9 TYPE 2 DIABETES MELLITUS WITHOUT COMPLICATION, WITHOUT LONG-TERM CURRENT USE OF INSULIN (HCC): ICD-10-CM

## 2019-01-25 DIAGNOSIS — F41.9 ANXIETY: ICD-10-CM

## 2019-01-25 DIAGNOSIS — I10 ESSENTIAL HYPERTENSION, BENIGN: ICD-10-CM

## 2019-01-25 DIAGNOSIS — E78.5 DYSLIPIDEMIA: ICD-10-CM

## 2019-01-25 DIAGNOSIS — I10 HYPERTENSION, UNSPECIFIED TYPE: ICD-10-CM

## 2019-01-25 DIAGNOSIS — G47.33 OBSTRUCTIVE SLEEP APNEA SYNDROME: ICD-10-CM

## 2019-01-25 DIAGNOSIS — R41.3 MEMORY DYSFUNCTION: ICD-10-CM

## 2019-01-25 PROCEDURE — 99214 OFFICE O/P EST MOD 30 MIN: CPT | Mod: GC | Performed by: INTERNAL MEDICINE

## 2019-01-25 RX ORDER — VALSARTAN AND HYDROCHLOROTHIAZIDE 320; 12.5 MG/1; MG/1
1 TABLET, FILM COATED ORAL DAILY
Qty: 90 TAB | Refills: 3 | Status: SHIPPED | OUTPATIENT
Start: 2019-01-25 | End: 2022-02-24 | Stop reason: SDUPTHER

## 2019-01-25 RX ORDER — ATORVASTATIN CALCIUM 20 MG/1
20 TABLET, FILM COATED ORAL DAILY
Qty: 90 TAB | Refills: 3 | Status: SHIPPED | OUTPATIENT
Start: 2019-01-25 | End: 2022-02-24 | Stop reason: SDUPTHER

## 2019-01-25 RX ORDER — ESOMEPRAZOLE MAGNESIUM 40 MG/1
40 CAPSULE, DELAYED RELEASE ORAL
Qty: 90 CAP | Refills: 3 | Status: SHIPPED | OUTPATIENT
Start: 2019-01-25 | End: 2022-02-24 | Stop reason: SDUPTHER

## 2019-01-25 RX ORDER — FLUOXETINE HYDROCHLORIDE 40 MG/1
40 CAPSULE ORAL DAILY
Qty: 90 CAP | Refills: 3 | Status: SHIPPED | OUTPATIENT
Start: 2019-01-25 | End: 2022-02-24 | Stop reason: SDUPTHER

## 2019-01-25 ASSESSMENT — PATIENT HEALTH QUESTIONNAIRE - PHQ9
SUM OF ALL RESPONSES TO PHQ QUESTIONS 1-9: 21
CLINICAL INTERPRETATION OF PHQ2 SCORE: 6
5. POOR APPETITE OR OVEREATING: 0 - NOT AT ALL

## 2019-01-25 NOTE — PROGRESS NOTES
Established Patient    Ramirez presents today with the following:    CC: Diabetes mellitus type II, hypertension, anxiety    HPI: Mr. Harmon is a very pleasant 58-year-old male with past medical history significant for botulism back in 2007 had a very prolonged hospital course back then had a tracheostomy and PEG tube eventually recovered with significant memory deficits, hypertension, dyslipidemia, obstructive sleep apnea, dyslipidemia, diabetes mellitus type 2, GERD presents to the clinic for follow up visit.    Reports his mood is about the same and is stable.  He has recently changed diet and supervised by his wife is continuing to lose weight.  He is not happy about the fact that a lot of sugar has been cut from his diet but is trying to the positive side of being healthy.    Continues to have significant memory impairment with his history of [place] which limits his life but is learning to cope with it.  Dr. Rodriguez evaluated him in 2012 and noted him to have severe memory loss for both verbal and nonverbal information.  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    Previously referred for pulmonology for obstructive sleep apnea, not yet followed up with him.  They do not have information regarding the referral and are unsure about the follow-up.    Last A1c was 6.8, repeat labs ordered and is still pending.  They lost the paperwork for the labs.      Patient Active Problem List    Diagnosis Date Noted   • Microalbuminuria 09/07/2018   • Morbid obesity with BMI of 50.0-59.9, adult ([facility]) 08/01/2018   • GERD (gastroesophageal reflux disease) 07/31/2018   • Obesity 07/31/2018   • Anxiety 07/31/2018   • Type 2 diabetes mellitus (HCC) 12/04/2012   • Dyslipidemia 12/04/2012   • Essential hypertension, benign 12/04/2012   • Memory dysfunction 12/04/2012   • Botulism 12/04/2012   • Sleep apnea 12/04/2012       Current Outpatient  Prescriptions   Medication Sig Dispense Refill   • valsartan-hydrochlorothiazide (DIOVAN-HCT) 320-12.5 MG per tablet Take 1 Tab by mouth every day. 90 Tab 3   • metformin (GLUCOPHAGE) 1000 MG tablet Take 1 Tab by mouth 2 times a day, with meals. 180 Tab 3   • fluoxetine (PROZAC) 40 MG capsule Take 1 Cap by mouth every day. 90 Cap 3   • esomeprazole (NEXIUM) 40 MG delayed-release capsule Take 1 Cap by mouth every morning before breakfast. 90 Cap 3   • atorvastatin (LIPITOR) 20 MG Tab Take 1 Tab by mouth every day. 90 Tab 3   • linagliptin (TRADJENTA) 5 MG Tab tablet Take 1 Tab by mouth every day. 90 Tab 2     No current facility-administered medications for this visit.        Social History     Social History   • Marital status:      Spouse name: N/A   • Number of children: N/A   • Years of education: N/A     Occupational History   • Not on file.     Social History Main Topics   • Smoking status: Former Smoker     Packs/day: 0.50     Types: Cigarettes     Quit date: 12/4/1980   • Smokeless tobacco: Former User     Types: Chew     Quit date: 12/4/1980      Comment: NOT REALLY SURE WHAT YEAR HE QUIT SMOKING & CHEW   • Alcohol use No   • Drug use: Yes     Types: Marijuana   • Sexual activity: Not on file     Other Topics Concern   • Not on file     Social History Narrative   • No narrative on file       Family History   Problem Relation Age of Onset   • Stroke Mother    • Heart Disease Father    • Cancer Neg Hx        ROS: As per HPI. Additional pertinent systems as noted below.    Constitutional: Denies fevers, Denies weight changes  Eyes: Denies changes in vision, no eye pain  Ears/Nose/Throat/Mouth: Denies nasal congestion or sore throat   Cardiovascular: Denies chest pain or palpitations   Respiratory: Denies shortness of breath , Denies cough  Gastrointestinal/Hepatic: Denies abdominal pain, nausea, vomiting, diarrhea, constipation or GI bleeding   Genitourinary: Denies bladder dysfunction, dysuria or  "frequency  Neurological: Denies headache, confusion, memory loss or focal weakness/parasthesias  Psychiatric: Reports mood disorder  Endocrine: denies hx of diabetes or thyroid dysfunction  Heme/Oncology/Lymph Nodes: Denies enlarged lymph nodes, denies brusing or known bleeding disorder  Allergic/Immunologic: Denies hx of allergies      All other systems were reviewed and are negative (AMA/CMS criteria)      /70 (BP Location: Left arm, Patient Position: Sitting, BP Cuff Size: Large adult)   Pulse 96   Temp (!) 35.7 °C (96.2 °F) (Temporal)   Ht 1.791 m (5' 10.5\")   Wt (!) 147 kg (324 lb)   SpO2 97%   BMI 45.83 kg/m²     Physical Exam  General:  Alert and oriented, No apparent distress.    Eyes: Pupils equal and reactive. No scleral icterus.    Throat: Clear no erythema or exudates noted.    Neck: Supple. No lymphadenopathy noted. Thyroid not enlarged.    Lungs: Clear to auscultation and percussion bilaterally.    Cardiovascular: Regular rate and rhythm. No murmurs, rubs or gallops.    Abdomen:  Benign. No rebound or guarding noted.    Extremities: No clubbing, cyanosis, edema.    Skin: Clear. No rash or suspicious skin lesions noted.      Note: I have reviewed all pertinent labs and diagnostic tests associated with this visit with specific comments listed under the assessment and plan below      Assessment and Plan    1. Type 2 diabetes mellitus without complication, without long-term current use of insulin (HCC)  Last A1c was well controlled at 6.8.  Continue metformin and linagliptin for now.  If A1c is the same or improved we will stop linagliptin.  Follows nutritionist.  Repeat A1c is pending.    2. Essential hypertension, benign  Well-controlled.  Continue valsartan and hydrochlorothiazide.  If weight loss continues, potentially could taper off one of the medication.    3. Obstructive sleep apnea syndrome  Previously diagnosed obstructive sleep apnea.  Refer to pulmonology.  Patient's wife will make " appointment for him, reteirated the importance of this and explained that this could help with mood and hypertension.    4. Anxiety  Stable, likely secondary to his memory issues.  Continue fluoxetine 40 mg daily.  Has somewhat of a flat affect, no warning signs or symptoms however.  Denies suicidal or homicidal ideation.    5. Memory dysfunction  Patient has significant memory difficulties.  Evaluated Dr. Rodriguez in 2013 January.  No wonder risk of the moment.  Do not see the possibility of memory returning after 11 years from the incident.  He is currently unable to work because of this.    6. Gastroesophageal reflux disease without esophagitis  Currently well controlled.  Continue Nexium.    7. Dyslipidemia  Continue atorvastatin 20 mg daily.  Lipid panel next visit.    8.  Obesity  BMI significantly elevated at 45.  However this is vastly improved from before and patient is continuing to make changes with help with his wife.    9.  Microalbuminuria  Likely secondary to diabetes.  Renal function is stable.  On maximum dose of valsartan.  Monitor every 6 months to year.    10.  Healthcare maintenance  Colonoscopy done in September 2017, repeat colonoscopy recommended in 3 years in 2020 for polyps.  Flu vaccine given the season.          Followup: Return in about 5 months (around 6/25/2019), or if symptoms worsen or fail to improve.      Signed by: Wilber Turner M.D.

## 2019-01-25 NOTE — PATIENT INSTRUCTIONS
Referral information sent to the following:     [facility] - Pulmonary Medicine   [address] [place] [address]  [phone]     Patient Care Coordination notes:Ready to schedule

## 2019-01-26 LAB — HBA1C MFR BLD: 5.6 % (ref 4.8–5.6)

## 2019-02-22 ENCOUNTER — TELEPHONE (OUTPATIENT)
Dept: INTERNAL MEDICINE | Facility: MEDICAL CENTER | Age: 59
End: 2019-02-22

## 2019-02-22 NOTE — TELEPHONE ENCOUNTER
Patient's A1C is 5.6. Left voicemail asking him to stop taking Tradjenta. He is continue taking metformin however.

## 2019-09-30 ENCOUNTER — TELEPHONE (OUTPATIENT)
Dept: MEDICAL GROUP | Facility: MEDICAL CENTER | Age: 59
End: 2019-09-30

## 2019-09-30 PROBLEM — E66.9 OBESITY: Status: RESOLVED | Noted: 2018-07-31 | Resolved: 2019-09-30

## 2019-09-30 PROBLEM — E66.01 MORBID OBESITY WITH BMI OF 50.0-59.9, ADULT (HCC): Status: RESOLVED | Noted: 2018-08-01 | Resolved: 2019-09-30

## 2022-02-24 DIAGNOSIS — E11.9 TYPE 2 DIABETES MELLITUS WITHOUT COMPLICATION, WITHOUT LONG-TERM CURRENT USE OF INSULIN (HCC): ICD-10-CM

## 2022-02-24 DIAGNOSIS — K21.9 GASTROESOPHAGEAL REFLUX DISEASE WITHOUT ESOPHAGITIS: ICD-10-CM

## 2022-02-24 DIAGNOSIS — I10 ESSENTIAL HYPERTENSION, BENIGN: ICD-10-CM

## 2022-02-24 DIAGNOSIS — F41.9 ANXIETY: ICD-10-CM

## 2022-02-24 DIAGNOSIS — E78.5 DYSLIPIDEMIA: ICD-10-CM

## 2022-02-24 DIAGNOSIS — R41.3 MEMORY DYSFUNCTION: ICD-10-CM

## 2022-02-24 DIAGNOSIS — I10 HYPERTENSION, UNSPECIFIED TYPE: ICD-10-CM

## 2022-02-24 RX ORDER — ATORVASTATIN CALCIUM 20 MG/1
20 TABLET, FILM COATED ORAL DAILY
Qty: 90 TABLET | Refills: 3 | Status: SHIPPED | OUTPATIENT
Start: 2022-02-24 | End: 2023-02-27 | Stop reason: SDUPTHER

## 2022-02-24 RX ORDER — ESOMEPRAZOLE MAGNESIUM 40 MG/1
40 CAPSULE, DELAYED RELEASE ORAL
Qty: 90 CAPSULE | Refills: 3 | Status: SHIPPED | OUTPATIENT
Start: 2022-02-24 | End: 2023-02-27 | Stop reason: SDUPTHER

## 2022-02-24 RX ORDER — VALSARTAN AND HYDROCHLOROTHIAZIDE 320; 12.5 MG/1; MG/1
1 TABLET, FILM COATED ORAL DAILY
Qty: 90 TABLET | Refills: 3 | Status: SHIPPED | OUTPATIENT
Start: 2022-02-24 | End: 2022-02-28 | Stop reason: CLARIF

## 2022-02-24 RX ORDER — FLUOXETINE HYDROCHLORIDE 40 MG/1
40 CAPSULE ORAL DAILY
Qty: 90 CAPSULE | Refills: 3 | Status: SHIPPED | OUTPATIENT
Start: 2022-02-24 | End: 2023-04-10 | Stop reason: SDUPTHER

## 2022-02-24 NOTE — TELEPHONE ENCOUNTER
VOICEMAIL  1. Caller Name: lissette (spouse)                      Call Back Number: [phone]    2. Message: pt spouse lm requesting refill for Ramirez on all his meds as he is running out. Pt scheduled for 02/08/2022.    3. Patient approves office to leave a detailed voicemail/MyChart message: yes

## 2022-02-28 ENCOUNTER — OFFICE VISIT (OUTPATIENT)
Dept: INTERNAL MEDICINE | Facility: OTHER | Age: 62
End: 2022-02-28
Payer: COMMERCIAL

## 2022-02-28 VITALS
TEMPERATURE: 96.9 F | BODY MASS INDEX: 42.66 KG/M2 | DIASTOLIC BLOOD PRESSURE: 80 MMHG | HEIGHT: 72 IN | SYSTOLIC BLOOD PRESSURE: 149 MMHG | OXYGEN SATURATION: 95 % | HEART RATE: 91 BPM | WEIGHT: 315 LBS

## 2022-02-28 DIAGNOSIS — I10 ESSENTIAL HYPERTENSION, BENIGN: ICD-10-CM

## 2022-02-28 DIAGNOSIS — E66.01 MORBID OBESITY WITH BMI OF 40.0-44.9, ADULT (HCC): ICD-10-CM

## 2022-02-28 DIAGNOSIS — R73.03 PREDIABETES: ICD-10-CM

## 2022-02-28 DIAGNOSIS — E78.5 DYSLIPIDEMIA: ICD-10-CM

## 2022-02-28 PROCEDURE — 99204 OFFICE O/P NEW MOD 45 MIN: CPT | Mod: GC | Performed by: STUDENT IN AN ORGANIZED HEALTH CARE EDUCATION/TRAINING PROGRAM

## 2022-02-28 RX ORDER — CHLORTHALIDONE 25 MG/1
25 TABLET ORAL DAILY
Qty: 90 TABLET | Refills: 3 | Status: SHIPPED | OUTPATIENT
Start: 2022-02-28 | End: 2023-02-28 | Stop reason: SDUPTHER

## 2022-02-28 ASSESSMENT — ENCOUNTER SYMPTOMS
HEMOPTYSIS: 0
BLURRED VISION: 0
DEPRESSION: 0
HEARTBURN: 0
BRUISES/BLEEDS EASILY: 0
HEADACHES: 0
NAUSEA: 0
BACK PAIN: 0
SORE THROAT: 0
FEVER: 0
ABDOMINAL PAIN: 0
DIZZINESS: 0
SINUS PAIN: 0
COUGH: 0
SPUTUM PRODUCTION: 0
TINGLING: 0
SHORTNESS OF BREATH: 0
HALLUCINATIONS: 0
DOUBLE VISION: 0
WEIGHT LOSS: 0
NECK PAIN: 0
PHOTOPHOBIA: 0
PALPITATIONS: 0
SPEECH CHANGE: 0
CHILLS: 0
MYALGIAS: 0
VOMITING: 0
FOCAL WEAKNESS: 0
ORTHOPNEA: 0

## 2022-02-28 ASSESSMENT — PATIENT HEALTH QUESTIONNAIRE - PHQ9: CLINICAL INTERPRETATION OF PHQ2 SCORE: 0

## 2022-02-28 NOTE — PROGRESS NOTES
Established Patient    Patient Care Team:  Devonte Case M.D. as PCP - General (Internal Medicine)    HPI:  Ramirez Harmon is a 61 y.o. male who presents today with the following Chief Complaint(s):   Chief Complaint   Patient presents with   • Follow-Up     Mr Harmon is here for regular follow up for his chronic medical conditions.  Today patient does not have any complaints, states feeling well.  He is compliant with his medications.  BP noticed elevated 149/80, re-checked BP prior he left with BP of 140/85.    Review of Systems   Review of Systems   Constitutional: Negative for chills, fever and weight loss.   HENT: Negative for nosebleeds, sinus pain, sore throat and tinnitus.    Eyes: Negative for blurred vision, double vision and photophobia.   Respiratory: Negative for cough, hemoptysis, sputum production and shortness of breath.    Cardiovascular: Negative for chest pain, palpitations, orthopnea and leg swelling.   Gastrointestinal: Negative for abdominal pain, heartburn, nausea and vomiting.   Genitourinary: Negative for dysuria, frequency, hematuria and urgency.   Musculoskeletal: Negative for back pain, joint pain, myalgias and neck pain.   Skin: Negative for rash.   Neurological: Negative for dizziness, tingling, speech change, focal weakness and headaches.   Endo/Heme/Allergies: Does not bruise/bleed easily.   Psychiatric/Behavioral: Negative for depression, hallucinations and suicidal ideas.         Past Medical History:   Diagnosis Date   • Diabetes    • Hyperlipidemia    • Hypertension        Patient Active Problem List    Diagnosis Date Noted   • Morbid obesity with BMI of 40.0-44.9, adult ([facility]) 02/28/2022   • Microalbuminuria 09/07/2018   • GERD (gastroesophageal reflux disease) 07/31/2018   • Anxiety 07/31/2018   • Type 2 diabetes mellitus with microalbuminuria, without long-term current use of insulin ([facility]) 12/04/2012   • Dyslipidemia 12/04/2012   • Essential hypertension, benign 12/04/2012    • Memory dysfunction 2012   • Sleep apnea 2012   • Hx of Botulism 2007       Allergies:Patient has no known allergies.    Current Outpatient Medications   Medication Sig Dispense Refill   • chlorthalidone (HYGROTON) 25 MG Tab Take 1 Tablet by mouth every day. 90 Tablet 3   • atorvastatin (LIPITOR) 20 MG Tab Take 1 Tablet by mouth every day. 90 Tablet 3   • esomeprazole (NEXIUM) 40 MG delayed-release capsule Take 1 Capsule by mouth every morning before breakfast. 90 Capsule 3   • fluoxetine (PROZAC) 40 MG capsule Take 1 Capsule by mouth every day. 90 Capsule 3   • metformin (GLUCOPHAGE) 1000 MG tablet Take 1 Tablet by mouth 2 times a day with meals. 180 Tablet 3     No current facility-administered medications for this visit.       Social History     Tobacco Use   • Smoking status: Former Smoker     Packs/day: 0.50     Types: Cigarettes     Quit date: 1980     Years since quittin.2   • Smokeless tobacco: Former User     Types: Chew     Quit date: 1980   • Tobacco comment: NOT REALLY SURE WHAT YEAR HE QUIT SMOKING & CHEW   Substance Use Topics   • Alcohol use: No   • Drug use: Yes     Types: Marijuana       Family History   Problem Relation Age of Onset   • Stroke Mother    • Heart Disease Father    • Cancer Neg Hx          Physical Exam  Vitals:  /80 (BP Location: Left arm, Patient Position: Sitting, BP Cuff Size: Large adult)   Pulse 91   Temp 36.1 °C (96.9 °F) (Temporal)   Ht 1.829 m (6')   Wt (!) 150 kg (331 lb 3.2 oz)   SpO2 95%  Body mass index is 44.92 kg/m².     Constitutional:  Not in acute distress, well appearing.  HEENT:   NC/AT  Cardiovascular: Regular rate and rhythm. No murmurs or gallops.      Lungs:   Clear to auscultation bilaterally. No wheezes or crackles. No respiratory distress.  Abdomen: Not distended, soft, not tender. No guarding or rigidity. No masses.  Extremities:  No cyanosis/clubbing/edema. No obvious deformities.  Skin:  Warm and dry.  No  visible rashes.  Neurologic: Alert & oriented x 3, CN II-XII grossly intact, strength and sensation grossly intact.  No focal deficits noted.  Psychiatric:  Affect normal, mood normal, judgment normal.      Assessment and Plan:     Hypertension  - Compliant with medications  - Patient needs compliance with weight loss, exercise  - BP elevated this visit 149/80    Rechecked bp 140/85  - Asymptomatic, unremarkable physical exam  - Patient did not get lab done as addressed in last visit       PLAN:  - Continue same BP meds  - Weight loss, regular exercise, low salt diet  - Labs  - BP monitoring at least few times per week  - Follow up in 6 months    Prediabetes:  - On metformin 1 gr BID  - Most recent A1c: 6.2% 2/2021  - Did not get labs as ordered last visit  - A1c prior next visit    DLD  - On Lipitor  - Last lipid profile wnl  - CTM future      Obesity class 3  - Struggles with diet  - Needs to exercise more regularly   - We discuss this every single visit  - Weight basically unchanged  - CTM    Return in about 6 months (around 8/28/2022).        Please note that this dictation was created using voice recognition software. I have made every reasonable attempt to correct obvious errors, but I expect that there are errors of grammar and possibly content that I did not discover before finalizing the note.    Total face-to-face time spent in medical decision making:    Dr. Manpreet M.D. PGY-2  [facility]

## 2022-02-28 NOTE — PATIENT INSTRUCTIONS
- Continue home meds without changes  - Labs  - Weight loss, regular exercise  - Follow up in 6 months

## 2022-08-23 ENCOUNTER — OFFICE VISIT (OUTPATIENT)
Dept: INTERNAL MEDICINE | Facility: OTHER | Age: 62
End: 2022-08-23
Payer: COMMERCIAL

## 2022-08-23 VITALS
TEMPERATURE: 96.3 F | DIASTOLIC BLOOD PRESSURE: 80 MMHG | BODY MASS INDEX: 42.66 KG/M2 | HEART RATE: 101 BPM | SYSTOLIC BLOOD PRESSURE: 135 MMHG | HEIGHT: 72 IN | WEIGHT: 315 LBS | OXYGEN SATURATION: 95 %

## 2022-08-23 DIAGNOSIS — E78.5 DYSLIPIDEMIA: ICD-10-CM

## 2022-08-23 DIAGNOSIS — R80.9 TYPE 2 DIABETES MELLITUS WITH MICROALBUMINURIA, WITHOUT LONG-TERM CURRENT USE OF INSULIN (HCC): ICD-10-CM

## 2022-08-23 DIAGNOSIS — E66.01 MORBID OBESITY WITH BMI OF 40.0-44.9, ADULT (HCC): ICD-10-CM

## 2022-08-23 DIAGNOSIS — I10 ESSENTIAL HYPERTENSION, BENIGN: ICD-10-CM

## 2022-08-23 DIAGNOSIS — E11.29 TYPE 2 DIABETES MELLITUS WITH MICROALBUMINURIA, WITHOUT LONG-TERM CURRENT USE OF INSULIN (HCC): ICD-10-CM

## 2022-08-23 PROCEDURE — 99214 OFFICE O/P EST MOD 30 MIN: CPT | Mod: GC | Performed by: STUDENT IN AN ORGANIZED HEALTH CARE EDUCATION/TRAINING PROGRAM

## 2022-08-23 ASSESSMENT — ENCOUNTER SYMPTOMS
TINGLING: 0
SHORTNESS OF BREATH: 0
SPUTUM PRODUCTION: 0
ABDOMINAL PAIN: 0
SINUS PAIN: 0
SENSORY CHANGE: 0
NAUSEA: 0
INSOMNIA: 0
VOMITING: 0
NERVOUS/ANXIOUS: 0
FEVER: 0
WEAKNESS: 0
WEIGHT LOSS: 0
ORTHOPNEA: 0
DEPRESSION: 0
BLURRED VISION: 0
PHOTOPHOBIA: 0
MYALGIAS: 0
COUGH: 0
HEMOPTYSIS: 0
HEARTBURN: 0
CHILLS: 0
BACK PAIN: 0
DIZZINESS: 0
DOUBLE VISION: 0
BLOOD IN STOOL: 0
BRUISES/BLEEDS EASILY: 0
PALPITATIONS: 0
FOCAL WEAKNESS: 0
NECK PAIN: 0
TREMORS: 0
SORE THROAT: 0
HEADACHES: 0
SPEECH CHANGE: 0

## 2022-08-23 ASSESSMENT — LIFESTYLE VARIABLES: SUBSTANCE_ABUSE: 0

## 2022-08-23 NOTE — PROGRESS NOTES
Established Patient    Patient Care Team:  Devonte Case M.D. as PCP - General (Internal Medicine)    HPI:  Ramirez Harmon is a 62 y.o. male who presents today with the following Chief Complaint(s):   Chief Complaint   Patient presents with    Lab Results     Ramirez is here for his regular follow up for his chronic medical conditions.  Recent lab work earlier this month showed:  Worsened A1c: 7.0 % from 6.2% prior test, slightly worse LDL:115.  Patient states feeling ok, no complaints.  Wife reports that he overeats and also eats very frequently junk food, also reports that he does not do any type of exercise. Current weight 339 lbs 8 lbs heavier than previous visit. He rarely check his BP at home, today's BP initially slightly elevated but checked 10 min later was normotensive.    Review of Systems   Review of Systems   Constitutional:  Negative for chills, fever, malaise/fatigue and weight loss.   HENT:  Negative for congestion, ear discharge, sinus pain, sore throat and tinnitus.    Eyes:  Negative for blurred vision, double vision and photophobia.   Respiratory:  Negative for cough, hemoptysis, sputum production and shortness of breath.    Cardiovascular:  Negative for chest pain, palpitations, orthopnea and leg swelling.   Gastrointestinal:  Negative for abdominal pain, blood in stool, heartburn, melena, nausea and vomiting.   Genitourinary:  Negative for dysuria, frequency, hematuria and urgency.   Musculoskeletal:  Negative for back pain, joint pain, myalgias and neck pain.   Skin:  Negative for rash.   Neurological:  Negative for dizziness, tingling, tremors, sensory change, speech change, focal weakness, weakness and headaches.   Endo/Heme/Allergies:  Negative for environmental allergies. Does not bruise/bleed easily.   Psychiatric/Behavioral:  Negative for depression, substance abuse and suicidal ideas. The patient is not nervous/anxious and does not have insomnia.        Past Medical History:    Diagnosis Date    Diabetes     Hyperlipidemia     Hypertension        Patient Active Problem List    Diagnosis Date Noted    Morbid obesity with BMI of 40.0-44.9, adult ([facility]) 2022    GERD (gastroesophageal reflux disease) 2018    Anxiety 2018    Type 2 diabetes mellitus with microalbuminuria, without long-term current use of insulin ([facility]) 2012    Dyslipidemia 2012    Essential hypertension, benign 2012    Memory dysfunction 2012    Sleep apnea 2012    Hx of Botulism 2007       Allergies:Patient has no known allergies.    Current Outpatient Medications   Medication Sig Dispense Refill    chlorthalidone (HYGROTON) 25 MG Tab Take 1 Tablet by mouth every day. 90 Tablet 3    atorvastatin (LIPITOR) 20 MG Tab Take 1 Tablet by mouth every day. 90 Tablet 3    esomeprazole (NEXIUM) 40 MG delayed-release capsule Take 1 Capsule by mouth every morning before breakfast. 90 Capsule 3    fluoxetine (PROZAC) 40 MG capsule Take 1 Capsule by mouth every day. 90 Capsule 3    metformin (GLUCOPHAGE) 1000 MG tablet Take 1 Tablet by mouth 2 times a day with meals. 180 Tablet 3     No current facility-administered medications for this visit.       Social History     Tobacco Use    Smoking status: Former     Packs/day: 0.50     Types: Cigarettes     Quit date: 1980     Years since quittin.7    Smokeless tobacco: Former     Types: Chew     Quit date: 1980    Tobacco comments:     NOT REALLY SURE WHAT YEAR HE QUIT SMOKING & CHEW   Vaping Use    Vaping Use: Never used   Substance Use Topics    Alcohol use: No    Drug use: Yes     Types: Marijuana       Family History   Problem Relation Age of Onset    Stroke Mother     Heart Disease Father     Cancer Neg Hx          Physical Exam  Vitals:  /80 (BP Location: Right arm, Patient Position: Sitting, BP Cuff Size: Adult long)   Pulse (!) 101   Temp (!) 35.7 °C (96.3 °F) (Temporal)   Ht 1.829 m (6')   Wt (!) 154 kg (339  lb 12.8 oz)   SpO2 95%  Body mass index is 46.09 kg/m².    Constitutional:  Not in acute distress, well appearing.  HEENT:   NC/AT, PERRLA.  Cardiovascular: Regular rate and rhythm. No murmurs or gallops.      Lungs:   Clear to auscultation bilaterally. No wheezes or crackles. No respiratory distress.  Abdomen: Obese, not distended, soft, not tender. No guarding or rigidity. No masses.  Extremities:  No cyanosis/clubbing/edema. No obvious deformities.  Skin:  Warm and dry.  No visible rashes.  Neurologic: Alert & oriented x 3, CN II-XII grossly intact, strength and sensation grossly intact.  No focal deficits noted.  Psychiatric:  Affect normal, mood normal, judgment normal.      Assessment and Plan:     Hypertension:  - Apparently well controlled  - Does not check his BP at home often  - Today's BP initially elevated but better 10 min later (135/80), patient stated that he always feels somehow nervous when he comes for office visits  - Asymptomatic, unremarkable exam       PLAN:  - Continue same regimen  - Encourage regular exercise, low salt diet, weight loss, DASH diet  - CMP, microalb creat ratio  - Follow up in 6 months    Type 2 DM  - Worsening A1c: 7.0  - Six months ago 6.2%  - Not unusual to have fluctuations from prediabetic- diabetic range A1c  - Patient on Metformin 1 gr BID  - He is non-compliant with Diet, or exercise  - Per wife he eats Junk food all the time and very large food portions   - Patient also 8 lbs heavier than prior visit  - Encouraged him to start exercising and limit his food intake and also avoid junk food.           PLAN:  - Continue Metformin 1 gr BID  - No adding other drug  - Strict low carb diet, low calorie, not more than 2400 kcal a day  - Incorporate exercise 4-5 times per week, 30-40 min aerobic exercise  - Lab work  - Follow up in 6 months    DLD:  - Slightly worsening LDL currently 115  - Already on Lipitor  - Poor compliance with diet  - We will repeat in 6 months lipid  profile    Obesity class 3:  - Currently 339 lbs he is 8 lbs heavier than past visit  - Non-compliance with diet or exercise  - Encouraged to start walking his dog and limit food intake, smaller food portions and also avoid junk food at least most days of the week.  - Declines referral to Dietician  - CTM       Return in about 6 months (around 2/23/2023).        Please note that this dictation was created using voice recognition software. I have made every reasonable attempt to correct obvious errors, but I expect that there are errors of grammar and possibly content that I did not discover before finalizing the note.    Total face-to-face time spent in medical decision making:    Dr. Manpreet M.D. PGY-3  [facility]

## 2022-08-23 NOTE — PATIENT INSTRUCTIONS
- Continue all home meds unchanged  - Start exercising, walks, stationary bike, etc  - Weight loss  - Low carb low calorie diet  - Avoid junk food  - Get lab work prior next visit  - Follow up in 6 months

## 2023-02-10 ENCOUNTER — OFFICE VISIT (OUTPATIENT)
Dept: INTERNAL MEDICINE | Facility: OTHER | Age: 63
End: 2023-02-10
Payer: COMMERCIAL

## 2023-02-10 VITALS
OXYGEN SATURATION: 95 % | DIASTOLIC BLOOD PRESSURE: 85 MMHG | WEIGHT: 315 LBS | TEMPERATURE: 98.6 F | HEART RATE: 88 BPM | SYSTOLIC BLOOD PRESSURE: 140 MMHG | BODY MASS INDEX: 42.66 KG/M2 | HEIGHT: 72 IN

## 2023-02-10 DIAGNOSIS — E11.9 TYPE 2 DIABETES MELLITUS WITHOUT COMPLICATION, WITHOUT LONG-TERM CURRENT USE OF INSULIN (HCC): ICD-10-CM

## 2023-02-10 DIAGNOSIS — E66.01 MORBID OBESITY WITH BMI OF 45.0-49.9, ADULT (HCC): ICD-10-CM

## 2023-02-10 DIAGNOSIS — I10 ESSENTIAL HYPERTENSION, BENIGN: ICD-10-CM

## 2023-02-10 DIAGNOSIS — E78.5 DYSLIPIDEMIA: ICD-10-CM

## 2023-02-10 PROCEDURE — 99214 OFFICE O/P EST MOD 30 MIN: CPT | Mod: GC | Performed by: STUDENT IN AN ORGANIZED HEALTH CARE EDUCATION/TRAINING PROGRAM

## 2023-02-10 RX ORDER — LISINOPRIL 10 MG/1
10 TABLET ORAL DAILY
Qty: 90 TABLET | Refills: 3 | Status: SHIPPED | OUTPATIENT
Start: 2023-02-10 | End: 2023-03-15 | Stop reason: SDUPTHER

## 2023-02-10 ASSESSMENT — ENCOUNTER SYMPTOMS
HALLUCINATIONS: 0
HEADACHES: 0
MYALGIAS: 0
SPEECH CHANGE: 0
ABDOMINAL PAIN: 0
BLOOD IN STOOL: 0
WEIGHT LOSS: 0
ORTHOPNEA: 0
CHILLS: 0
SORE THROAT: 0
FEVER: 0
TINGLING: 0
DOUBLE VISION: 0
SINUS PAIN: 0
DEPRESSION: 0
BLURRED VISION: 0
SPUTUM PRODUCTION: 0
VOMITING: 0
BACK PAIN: 0
HEARTBURN: 0
DIZZINESS: 0
BRUISES/BLEEDS EASILY: 0
COUGH: 0
HEMOPTYSIS: 0
NECK PAIN: 0
TREMORS: 0
SHORTNESS OF BREATH: 0
PHOTOPHOBIA: 0
DIARRHEA: 0
SENSORY CHANGE: 0
PALPITATIONS: 0
NAUSEA: 0

## 2023-02-10 ASSESSMENT — PATIENT HEALTH QUESTIONNAIRE - PHQ9: CLINICAL INTERPRETATION OF PHQ2 SCORE: 0

## 2023-02-10 ASSESSMENT — LIFESTYLE VARIABLES: SUBSTANCE_ABUSE: 0

## 2023-02-10 NOTE — PROGRESS NOTES
Established Patient    Patient Care Team:  Devonte Case M.D. as PCP - General (Internal Medicine)    HPI:  Ramirez Harmon is a 62 y.o. male who presents today with the following Chief Complaint(s):   Chief Complaint   Patient presents with    Follow-Up     General check up     Lab Results     Mr. Harmon come today for follow up for chronic conditions: HTN, DLD, DM.  Lab work this month showed: CMP with elevated , UA: microalbuminuria:95, Lipid profile: LDL:103, A1c: 7.0%. Labs basically not much changed since prior visit.  He has struggled with weight loss, he is not willing to do much about it, does not follow strict diet and per wife he is always eating a lot of unhealthy snacks and desserts between and after meals.  Patient not showing interest in making lifestyle modification changes.    Review of Systems   Review of Systems   Constitutional:  Negative for chills, fever, malaise/fatigue and weight loss.   HENT:  Negative for congestion, nosebleeds, sinus pain, sore throat and tinnitus.    Eyes:  Negative for blurred vision, double vision and photophobia.   Respiratory:  Negative for cough, hemoptysis, sputum production and shortness of breath.    Cardiovascular:  Negative for chest pain, palpitations, orthopnea and leg swelling.   Gastrointestinal:  Negative for abdominal pain, blood in stool, diarrhea, heartburn, melena, nausea and vomiting.   Genitourinary:  Negative for dysuria, frequency, hematuria and urgency.   Musculoskeletal:  Negative for back pain, joint pain, myalgias and neck pain.   Skin:  Negative for rash.   Neurological:  Negative for dizziness, tingling, tremors, sensory change, speech change and headaches.   Endo/Heme/Allergies:  Does not bruise/bleed easily.   Psychiatric/Behavioral:  Negative for depression, hallucinations, substance abuse and suicidal ideas.        Past Medical History:   Diagnosis Date    Diabetes     Hyperlipidemia     Hypertension        Patient Active Problem  List    Diagnosis Date Noted    Morbid obesity with BMI of 45.0-49.9, adult ([facility]) 02/10/2023    Morbid obesity with BMI of 40.0-44.9, adult ([facility]) 2022    GERD (gastroesophageal reflux disease) 2018    Anxiety 2018    Type 2 diabetes mellitus with microalbuminuria, without long-term current use of insulin ([facility]) 2012    Dyslipidemia 2012    Essential hypertension, benign 2012    Memory dysfunction 2012    Sleep apnea 2012    Hx of Botulism 2007       Allergies:Patient has no known allergies.    Current Outpatient Medications   Medication Sig Dispense Refill    lisinopril (PRINIVIL) 10 MG Tab Take 1 Tablet by mouth every day. 90 Tablet 3    chlorthalidone (HYGROTON) 25 MG Tab Take 1 Tablet by mouth every day. 90 Tablet 3    atorvastatin (LIPITOR) 20 MG Tab Take 1 Tablet by mouth every day. 90 Tablet 3    esomeprazole (NEXIUM) 40 MG delayed-release capsule Take 1 Capsule by mouth every morning before breakfast. 90 Capsule 3    fluoxetine (PROZAC) 40 MG capsule Take 1 Capsule by mouth every day. 90 Capsule 3    metformin (GLUCOPHAGE) 1000 MG tablet Take 1 Tablet by mouth 2 times a day with meals. 180 Tablet 3     No current facility-administered medications for this visit.       Social History     Tobacco Use    Smoking status: Former     Packs/day: 0.50     Types: Cigarettes     Quit date: 1980     Years since quittin.2    Smokeless tobacco: Former     Types: Chew     Quit date: 1980    Tobacco comments:     NOT REALLY SURE WHAT YEAR HE QUIT SMOKING & CHEW   Vaping Use    Vaping Use: Never used   Substance Use Topics    Alcohol use: No    Drug use: Yes     Types: Marijuana       Family History   Problem Relation Age of Onset    Stroke Mother     Heart Disease Father     Cancer Neg Hx          Physical Exam  Vitals:  BP (!) 140/85 (BP Location: Right arm, Patient Position: Sitting, BP Cuff Size: Adult long)   Pulse 88   Temp 37 °C (98.6 °F) (Temporal)    Ht 1.829 m (6')   Wt (!) 158 kg (348 lb 9.6 oz)   SpO2 95%  Body mass index is 47.28 kg/m².    Constitutional:  Not in acute distress, well appearing.  HEENT:   NC/AT  Cardiovascular: Regular rate and rhythm. No murmurs or gallops.      Lungs:   Clear to auscultation bilaterally. No wheezes or crackles. No respiratory distress.  Abdomen: Not distended, soft, not tender. No guarding or rigidity. No masses.  Extremities:  No cyanosis/clubbing/edema. No obvious deformities.  Skin:  Warm and dry.  No visible rashes.  Neurologic: Alert & oriented x 3, CN II-XII grossly intact, strength and sensation grossly intact.  No focal deficits noted.  Psychiatric:  Affect normal, mood normal, judgment normal.      Assessment and Plan:     DM type 2  - Stable A1c: 7.0  - On metformin 1 gr BID  - Not following diet, he is not exercising at all   - 9 Lbs weight gain since last visit  - UA: microalbuminuria 95  - Suggested start Lisinopril  - BP not at goal       Plan:  - Continue metformin 1 gr BID  - Start Lisinopril 10 mg daily  - BMP in 10 days   - Daily BP monitoring  - Dietician referral  - Follow up in 4-6 months   - Ordered labs prior next visit, also Ophthalmologist referral    Hypertension  - Not well controlled  - Not checking BP at home frequently enough  - BP today 150's then 140/85  - UA: proteinuria  - Starting Lisinopril  - On Chlorthalidone 25 mg  - Daily BP monitoring  - BMP in 10 days after start new drug  - Follow up in 4-5 weeks    Dyslipidemia   - Inchanged  - LDL:103  - On Lipitor 20 mg  - Increase to 40 mg daily     Obesity class 3  - Not following diet  - Not exercising  - refused in the past seeing dietician  - Placed referral    Return in about 5 weeks (around 3/17/2023).      Please note that this dictation was created using voice recognition software. I have made every reasonable attempt to correct obvious errors, but I expect that there are errors of grammar and possibly content that I did not  discover before finalizing the note.    Total face-to-face time spent in medical decision making:    Dr. Manpreet M.D. PGY-3  [facility]

## 2023-02-10 NOTE — PATIENT INSTRUCTIONS
- Continue home medications  - Start Lisinopril 10 mg daily  - Daily BP monitoring  - Bring BP log  - - Basic metabolic panel 8-10 days after initiating new BP drug  - Dietician referral follow up  - Diabetic diet, regular exercise, weight loss  - Other labs in 4-5 months  - Diabetes Mellitus follow up in 4-6 months

## 2023-02-21 DIAGNOSIS — E11.9 TYPE 2 DIABETES MELLITUS WITHOUT COMPLICATION, WITHOUT LONG-TERM CURRENT USE OF INSULIN (HCC): ICD-10-CM

## 2023-02-27 DIAGNOSIS — K21.9 GASTROESOPHAGEAL REFLUX DISEASE WITHOUT ESOPHAGITIS: ICD-10-CM

## 2023-02-27 DIAGNOSIS — E78.5 DYSLIPIDEMIA: ICD-10-CM

## 2023-02-27 RX ORDER — ESOMEPRAZOLE MAGNESIUM 40 MG/1
40 CAPSULE, DELAYED RELEASE ORAL
Qty: 90 CAPSULE | Refills: 3 | Status: SHIPPED | OUTPATIENT
Start: 2023-02-27 | End: 2024-01-15 | Stop reason: SDUPTHER

## 2023-02-27 RX ORDER — ATORVASTATIN CALCIUM 20 MG/1
20 TABLET, FILM COATED ORAL DAILY
Qty: 90 TABLET | Refills: 3 | Status: SHIPPED | OUTPATIENT
Start: 2023-02-27 | End: 2024-01-15 | Stop reason: SDUPTHER

## 2023-02-28 DIAGNOSIS — I10 ESSENTIAL HYPERTENSION, BENIGN: ICD-10-CM

## 2023-02-28 RX ORDER — CHLORTHALIDONE 25 MG/1
25 TABLET ORAL DAILY
Qty: 90 TABLET | Refills: 3 | Status: SHIPPED | OUTPATIENT
Start: 2023-02-28 | End: 2024-01-15 | Stop reason: SDUPTHER

## 2023-03-15 ENCOUNTER — OFFICE VISIT (OUTPATIENT)
Dept: INTERNAL MEDICINE | Facility: OTHER | Age: 63
End: 2023-03-15
Payer: COMMERCIAL

## 2023-03-15 VITALS
OXYGEN SATURATION: 98 % | WEIGHT: 315 LBS | BODY MASS INDEX: 42.66 KG/M2 | HEIGHT: 72 IN | TEMPERATURE: 96.8 F | HEART RATE: 89 BPM | SYSTOLIC BLOOD PRESSURE: 140 MMHG | DIASTOLIC BLOOD PRESSURE: 85 MMHG

## 2023-03-15 DIAGNOSIS — E66.01 MORBID OBESITY WITH BMI OF 45.0-49.9, ADULT (HCC): ICD-10-CM

## 2023-03-15 DIAGNOSIS — I10 ESSENTIAL HYPERTENSION, BENIGN: ICD-10-CM

## 2023-03-15 PROCEDURE — 99214 OFFICE O/P EST MOD 30 MIN: CPT | Mod: GC | Performed by: STUDENT IN AN ORGANIZED HEALTH CARE EDUCATION/TRAINING PROGRAM

## 2023-03-15 RX ORDER — LISINOPRIL 10 MG/1
20 TABLET ORAL DAILY
Qty: 90 TABLET | Refills: 3 | Status: SHIPPED | OUTPATIENT
Start: 2023-03-15

## 2023-03-15 NOTE — PROGRESS NOTES
Established Patient    Patient Care Team:  Devonte Case M.D. as PCP - General (Internal Medicine)    HPI:  Ramirez Harmon is a 62 y.o. male who presents today with the following Chief Complaint(s):   Chief Complaint   Patient presents with    Follow-Up     5 week fup     Lab Results     Ramirez is here to to follow up for Hypertension and lab work.  Last visit we added Lisinopril to his HTN regimen due to elevated BP's at office he does not check BP at home at all but in this past visit I instructed him to please monitor his BP at least few days per week since we needed to assess response to new drug.  Labs unremarkable no changes in creatinine after initiation of Lisinopril.  Patient did not check his BP at home a single time, he says that his BP machine does not work. I discussed this with him and his wife in past visit.  BP at office today 144/101 initially and then 140/85 manually 10 min later.    Review of Systems   Constitutional: Denies chills, fever, fatigue/malaise, weight changes  HEENT: Denies blurry vision, congestion, sore throat  Respiratory: Denies shortness of breath, cough, wheezing   Cardiovascular: Denies chest pain, palpitations, leg swelling  Gastrointestinal: Denies heartburn, nausea, vomiting, abdominal pain, constipation, diarrhea  Genitourinary: Denies dysuria, frequency  Musculoskeletal: Denies falls and myalgias.   Skin: Denies itching and rash.   Neurological: Negative for headaches, dizziness, loss of consciousness      Past Medical History:   Diagnosis Date    Diabetes     Hyperlipidemia     Hypertension        Patient Active Problem List    Diagnosis Date Noted    Morbid obesity with BMI of 45.0-49.9, adult ([facility]) 02/10/2023    Morbid obesity with BMI of 40.0-44.9, adult ([facility]) 02/28/2022    GERD (gastroesophageal reflux disease) 07/31/2018    Anxiety 07/31/2018    Type 2 diabetes mellitus with microalbuminuria, without long-term current use of insulin ([facility]) 12/04/2012     Dyslipidemia 2012    Essential hypertension, benign 2012    Memory dysfunction 2012    Sleep apnea 2012    Hx of Botulism 2007       Allergies:Patient has no known allergies.    Current Outpatient Medications   Medication Sig Dispense Refill    lisinopril (PRINIVIL) 10 MG Tab Take 2 Tablets by mouth every day. 90 Tablet 3    chlorthalidone (HYGROTON) 25 MG Tab Take 1 Tablet by mouth every day. 90 Tablet 3    atorvastatin (LIPITOR) 20 MG Tab Take 1 Tablet by mouth every day. 90 Tablet 3    esomeprazole (NEXIUM) 40 MG delayed-release capsule Take 1 Capsule by mouth every morning before breakfast. 90 Capsule 3    metformin (GLUCOPHAGE) 1000 MG tablet Take 1 Tablet by mouth 2 times a day with meals. 180 Tablet 3    fluoxetine (PROZAC) 40 MG capsule Take 1 Capsule by mouth every day. 90 Capsule 3     No current facility-administered medications for this visit.       Social History     Tobacco Use    Smoking status: Former     Packs/day: 0.50     Types: Cigarettes     Quit date: 1980     Years since quittin.3    Smokeless tobacco: Former     Types: Chew     Quit date: 1980    Tobacco comments:     NOT REALLY SURE WHAT YEAR HE QUIT SMOKING & CHEW   Vaping Use    Vaping Use: Never used   Substance Use Topics    Alcohol use: No    Drug use: Yes     Types: Marijuana       Family History   Problem Relation Age of Onset    Stroke Mother     Heart Disease Father     Cancer Neg Hx          Physical Exam  Vitals:  BP (!) 140/85 (BP Location: Right arm, Patient Position: Sitting, BP Cuff Size: Adult long)   Pulse 89   Temp 36 °C (96.8 °F) (Temporal)   Ht 1.829 m (6')   Wt (!) 153 kg (337 lb 6.4 oz)   SpO2 98%  Body mass index is 45.76 kg/m².    Constitutional:  Not in acute distress, well appearing.  HEENT:   NC/AT, EOMI, conjunctiva normal, hearing grossly intact  Cardiovascular: Regular rate and rhythm. No murmurs or gallops.      Lungs:   Clear to auscultation bilaterally. No  wheezes or crackles. No respiratory distress.  Abdomen: Not distended, soft, not tender. No guarding or rigidity. No masses.  Extremities:  No cyanosis/clubbing/edema. No obvious deformities.  Skin:  Warm and dry.  No visible rashes.  Neurologic: Alert & oriented x 3, CN II-XII grossly intact, strength and sensation grossly intact.  No focal deficits noted.  Psychiatric:  Affect normal, mood normal, judgment normal.      Assessment and Plan:     Hypertension  - Due to elevated BP readings at office initiated on second drug last visit (lisinopril 10 mg)  - CMP unremarkable after drug  - Did not check his BP at home  - BP here today 144/101 machine and 140/85 manually by me.  - Discussed again the importance of monitoring his BP at home in order to make safe decisions in management        Plan:  - Increase Lisinopril to 20 mg daily  - BMP in 10 days  - Must check BP at home at least 3 days a week  - Follow up in 6-7 weeks  - Call if BP > 150/100    Obesity class 3  - Encourage weight loss  - Patient does not care about making any changes in diet or exercise  - Refuses to see Dietician    Problem List Items Addressed This Visit       Essential hypertension, benign    Relevant Medications    lisinopril (PRINIVIL) 10 MG Tab    Other Relevant Orders    Basic Metabolic Panel    Morbid obesity with BMI of 45.0-49.9, adult ([facility])       Return in about 7 weeks (around 5/3/2023).    Please note that this dictation was created using voice recognition software. I have made every reasonable attempt to correct obvious errors, but I expect that there are errors of grammar and possibly content that I did not discover before finalizing the note.    Dr. Manpreet M.D. PGY-3  [facility]

## 2023-03-15 NOTE — PATIENT INSTRUCTIONS
- Continue home medications  - Increase Lisinopril to 20 mg daily  - Lab work 8-10 after increasing dose of Lisinopril  - Monitor BP at home at least 3 times per week  - Bring BP log to next visit  - Follow up in 7 weeks

## 2023-04-10 DIAGNOSIS — R41.3 MEMORY DYSFUNCTION: ICD-10-CM

## 2023-04-10 DIAGNOSIS — I10 ESSENTIAL HYPERTENSION, BENIGN: ICD-10-CM

## 2023-04-10 DIAGNOSIS — F41.9 ANXIETY: ICD-10-CM

## 2023-04-10 RX ORDER — FLUOXETINE HYDROCHLORIDE 40 MG/1
40 CAPSULE ORAL DAILY
Qty: 90 CAPSULE | Refills: 3 | Status: SHIPPED | OUTPATIENT
Start: 2023-04-10 | End: 2024-01-15 | Stop reason: SDUPTHER

## 2023-06-09 ENCOUNTER — OFFICE VISIT (OUTPATIENT)
Dept: INTERNAL MEDICINE | Facility: OTHER | Age: 63
End: 2023-06-09
Payer: COMMERCIAL

## 2023-06-09 VITALS
HEART RATE: 92 BPM | HEIGHT: 72 IN | WEIGHT: 315 LBS | TEMPERATURE: 97.1 F | BODY MASS INDEX: 42.66 KG/M2 | OXYGEN SATURATION: 95 % | DIASTOLIC BLOOD PRESSURE: 78 MMHG | SYSTOLIC BLOOD PRESSURE: 135 MMHG

## 2023-06-09 DIAGNOSIS — E78.5 DYSLIPIDEMIA: ICD-10-CM

## 2023-06-09 DIAGNOSIS — I10 ESSENTIAL HYPERTENSION, BENIGN: ICD-10-CM

## 2023-06-09 DIAGNOSIS — E11.9 TYPE 2 DIABETES MELLITUS WITHOUT COMPLICATION, WITHOUT LONG-TERM CURRENT USE OF INSULIN (HCC): ICD-10-CM

## 2023-06-09 DIAGNOSIS — E66.01 MORBID OBESITY WITH BMI OF 45.0-49.9, ADULT (HCC): ICD-10-CM

## 2023-06-09 PROCEDURE — 3078F DIAST BP <80 MM HG: CPT | Mod: GC | Performed by: STUDENT IN AN ORGANIZED HEALTH CARE EDUCATION/TRAINING PROGRAM

## 2023-06-09 PROCEDURE — 99214 OFFICE O/P EST MOD 30 MIN: CPT | Mod: GC | Performed by: STUDENT IN AN ORGANIZED HEALTH CARE EDUCATION/TRAINING PROGRAM

## 2023-06-09 PROCEDURE — 3075F SYST BP GE 130 - 139MM HG: CPT | Mod: GC | Performed by: STUDENT IN AN ORGANIZED HEALTH CARE EDUCATION/TRAINING PROGRAM

## 2023-06-09 NOTE — PATIENT INSTRUCTIONS
- Continue all medications no changes  - Weight loss, regular exercise  - Lab work  - Follow up in 5 months  - Re-establish care with new PCP

## 2023-06-09 NOTE — PROGRESS NOTES
Established Patient    Patient Care Team:  Devonte Case M.D. as PCP - General (Internal Medicine)    HPI:  Ramirez Harmon is a 63 y.o. male who presents today with the following Chief Complaint(s):   Chief Complaint   Patient presents with    Hypertension     Follow up     Mr. Harmon is here today for regular follow up for chronic medical conditions.  He could not get lab work done prior this visit.  Patient states feeling well, last visit we adjusted BP management and his BP has been better, he is not monitoring his BP at home as frequent as I recommended but he has done so sporadically and apparently BP has not been elevated as it was before. BP today at office 135/78.  He denies any symptoms, states feeling well.  I recommended to continue monitoring BP more often and bring BP log so we can do adjustments on therapy if is needed.     Review of Systems   Constitutional: Denies chills, fever, fatigue/malaise, weight changes  HEENT: Denies blurry vision, congestion, sore throat  Respiratory: Denies shortness of breath, cough, wheezing   Cardiovascular: Denies chest pain, palpitations, leg swelling  Gastrointestinal: Denies heartburn, nausea, vomiting, abdominal pain, constipation, diarrhea  Genitourinary: Denies dysuria, frequency  Musculoskeletal: Denies falls and myalgias.   Skin: Denies itching and rash.   Neurological: Negative for headaches, dizziness, loss of consciousness      Past Medical History:   Diagnosis Date    Diabetes     Hyperlipidemia     Hypertension        Patient Active Problem List    Diagnosis Date Noted    Morbid obesity with BMI of 40.0-44.9, adult ([facility]) 02/28/2022    GERD (gastroesophageal reflux disease) 07/31/2018    Anxiety 07/31/2018    Type 2 diabetes mellitus with microalbuminuria, without long-term current use of insulin ([facility]) 12/04/2012    Dyslipidemia 12/04/2012    Essential hypertension, benign 12/04/2012    Memory dysfunction 12/04/2012    Sleep apnea 12/04/2012    Hx of  Botulism 2007       Allergies:Patient has no known allergies.    Current Outpatient Medications   Medication Sig Dispense Refill    fluoxetine (PROZAC) 40 MG capsule Take 1 Capsule by mouth every day. 90 Capsule 3    lisinopril (PRINIVIL) 10 MG Tab Take 2 Tablets by mouth every day. 90 Tablet 3    chlorthalidone (HYGROTON) 25 MG Tab Take 1 Tablet by mouth every day. 90 Tablet 3    atorvastatin (LIPITOR) 20 MG Tab Take 1 Tablet by mouth every day. 90 Tablet 3    esomeprazole (NEXIUM) 40 MG delayed-release capsule Take 1 Capsule by mouth every morning before breakfast. 90 Capsule 3    metformin (GLUCOPHAGE) 1000 MG tablet Take 1 Tablet by mouth 2 times a day with meals. 180 Tablet 3     No current facility-administered medications for this visit.       Social History     Tobacco Use    Smoking status: Former     Packs/day: 0.50     Types: Cigarettes     Quit date: 1980     Years since quittin.5    Smokeless tobacco: Former     Types: Chew     Quit date: 1980    Tobacco comments:     NOT REALLY SURE WHAT YEAR HE QUIT SMOKING & CHEW   Vaping Use    Vaping Use: Never used   Substance Use Topics    Alcohol use: No    Drug use: Yes     Types: Marijuana       Family History   Problem Relation Age of Onset    Stroke Mother     Heart Disease Father     Cancer Neg Hx          Physical Exam  Vitals:  /78 (BP Location: Left arm, Patient Position: Sitting, BP Cuff Size: Large adult)   Pulse 92   Temp 36.2 °C (97.1 °F) (Temporal)   Ht 1.829 m (6')   Wt (!) 158 kg (347 lb 9.6 oz)   SpO2 95%  Body mass index is 47.14 kg/m².    Constitutional:  Not in acute distress, well appearing.  HEENT:   NC/AT, EOMI, conjunctiva normal, hearing grossly intact  Cardiovascular: Regular rate and rhythm. No murmurs or gallops.      Lungs:   Clear to auscultation bilaterally. No wheezes or crackles. No respiratory distress.  Abdomen: Not distended, soft, not tender. No guarding or rigidity. No masses.  Extremities:  No  cyanosis/clubbing/edema. No obvious deformities.  Skin:  Warm and dry.  No visible rashes.  Neurologic: Alert & oriented x 3, CN II-XII grossly intact, strength and sensation grossly intact.  No focal deficits noted.  Psychiatric:  Affect normal, mood normal, judgment normal.      Assessment and Plan:     Hypertension  - Improving but not checking his BP at home enough  - Today 135/78   - Asymptomatic  - Unremarkable physical findings  - Currently on lisinopril 20 mg daily and chlorthalidone 25 mg    Plan:  - Continue same therapy  - Must monitor BP more frequently and write it down   - Bring BP log to next visit  - If BP at home >140/90 call office  - Low salt diet, weight loss, regular exercise  - Labs  - Follow up in 4-5 months if normotensive  - Follow up in 1 month if BP not at goal    DM type 2  - Forgot lab work  - Has been well controlled  - Past A1c times 2 were 7.0%  - Taking Metformin 1 gr BID  - Not compliant with diet and exercise       Plan:  - Continue metformin  - Encouraged weight loss and regular exercise  - Lab work  - Follow up in 4-5 months    Obesity class 3  - BMI currently 47%  - 10 lbs weight gain since past visit  - Not adherent with diet or exercise  - Declines seeing Dietician  - Encourage weight loss  - Not a good candidate for surgery     Dyslipidemia  - Well managed   - On Lipitor 20 mg  - Last lipid panel unremarkable labs  - Continue same management  - Labs in future  - Encouraged low rusty/diabetic diet, regular exercise    Problem List Items Addressed This Visit    None  Visit Diagnoses       Type 2 diabetes mellitus without complication, without long-term current use of insulin (HCC)        Relevant Orders    Comp Metabolic Panel    Hemoglobin A1c    Microalbumin Creat Ratio Urine - Lab Collect    Diabetic Monofilament Lower Extremity Exam (Completed)            Return in about 5 months (around 11/9/2023).    Please note that this dictation was created using voice recognition  software. I have made every reasonable attempt to correct obvious errors, but I expect that there are errors of grammar and possibly content that I did not discover before finalizing the note.    Dr. Manpreet M.D. PGY-3  [facility]

## 2023-08-21 ENCOUNTER — HOSPITAL ENCOUNTER (OUTPATIENT)
Dept: LAB | Facility: MEDICAL CENTER | Age: 63
End: 2023-08-21
Attending: FAMILY MEDICINE
Payer: COMMERCIAL

## 2023-08-21 ENCOUNTER — OFFICE VISIT (OUTPATIENT)
Dept: MEDICAL GROUP | Facility: PHYSICIAN GROUP | Age: 63
End: 2023-08-21
Payer: COMMERCIAL

## 2023-08-21 VITALS
DIASTOLIC BLOOD PRESSURE: 76 MMHG | RESPIRATION RATE: 18 BRPM | WEIGHT: 315 LBS | OXYGEN SATURATION: 97 % | TEMPERATURE: 98.1 F | SYSTOLIC BLOOD PRESSURE: 116 MMHG | HEART RATE: 110 BPM | HEIGHT: 72 IN | BODY MASS INDEX: 42.66 KG/M2

## 2023-08-21 DIAGNOSIS — K21.9 GASTROESOPHAGEAL REFLUX DISEASE WITHOUT ESOPHAGITIS: ICD-10-CM

## 2023-08-21 DIAGNOSIS — R53.83 OTHER FATIGUE: ICD-10-CM

## 2023-08-21 DIAGNOSIS — R10.9 STOMACH PAIN: ICD-10-CM

## 2023-08-21 DIAGNOSIS — E55.9 VITAMIN D DEFICIENCY: ICD-10-CM

## 2023-08-21 DIAGNOSIS — Z12.5 SCREENING FOR PROSTATE CANCER: ICD-10-CM

## 2023-08-21 DIAGNOSIS — E78.5 DYSLIPIDEMIA: ICD-10-CM

## 2023-08-21 DIAGNOSIS — R80.9 TYPE 2 DIABETES MELLITUS WITH MICROALBUMINURIA, WITHOUT LONG-TERM CURRENT USE OF INSULIN (HCC): ICD-10-CM

## 2023-08-21 DIAGNOSIS — I10 ESSENTIAL HYPERTENSION, BENIGN: ICD-10-CM

## 2023-08-21 DIAGNOSIS — R41.3 MEMORY DYSFUNCTION: ICD-10-CM

## 2023-08-21 DIAGNOSIS — G47.33 OBSTRUCTIVE SLEEP APNEA SYNDROME: ICD-10-CM

## 2023-08-21 DIAGNOSIS — E11.29 TYPE 2 DIABETES MELLITUS WITH MICROALBUMINURIA, WITHOUT LONG-TERM CURRENT USE OF INSULIN (HCC): ICD-10-CM

## 2023-08-21 LAB
25(OH)D3 SERPL-MCNC: 9 NG/ML (ref 30–100)
ALBUMIN SERPL BCP-MCNC: 4.2 G/DL (ref 3.2–4.9)
ALBUMIN/GLOB SERPL: 1.1 G/DL
ALP SERPL-CCNC: 79 U/L (ref 30–99)
ALT SERPL-CCNC: 31 U/L (ref 2–50)
ANION GAP SERPL CALC-SCNC: 16 MMOL/L (ref 7–16)
APPEARANCE UR: ABNORMAL
AST SERPL-CCNC: 19 U/L (ref 12–45)
BACTERIA #/AREA URNS HPF: NEGATIVE /HPF
BASOPHILS # BLD AUTO: 0.9 % (ref 0–1.8)
BASOPHILS # BLD: 0.11 K/UL (ref 0–0.12)
BILIRUB SERPL-MCNC: 0.2 MG/DL (ref 0.1–1.5)
BILIRUB UR QL STRIP.AUTO: NEGATIVE
BUN SERPL-MCNC: 16 MG/DL (ref 8–22)
CALCIUM ALBUM COR SERPL-MCNC: 9.8 MG/DL (ref 8.5–10.5)
CALCIUM SERPL-MCNC: 10 MG/DL (ref 8.5–10.5)
CHLORIDE SERPL-SCNC: 102 MMOL/L (ref 96–112)
CO2 SERPL-SCNC: 21 MMOL/L (ref 20–33)
COLOR UR: YELLOW
CREAT SERPL-MCNC: 0.89 MG/DL (ref 0.5–1.4)
EOSINOPHIL # BLD AUTO: 0.17 K/UL (ref 0–0.51)
EOSINOPHIL NFR BLD: 1.4 % (ref 0–6.9)
EPI CELLS #/AREA URNS HPF: NEGATIVE /HPF
ERYTHROCYTE [DISTWIDTH] IN BLOOD BY AUTOMATED COUNT: 45.9 FL (ref 35.9–50)
FASTING STATUS PATIENT QL REPORTED: NORMAL
GFR SERPLBLD CREATININE-BSD FMLA CKD-EPI: 97 ML/MIN/1.73 M 2
GLOBULIN SER CALC-MCNC: 3.7 G/DL (ref 1.9–3.5)
GLUCOSE BLD-MCNC: 134 MG/DL (ref 65–99)
GLUCOSE SERPL-MCNC: 138 MG/DL (ref 65–99)
GLUCOSE UR STRIP.AUTO-MCNC: NEGATIVE MG/DL
HBA1C MFR BLD: 7.3 % (ref ?–5.8)
HCT VFR BLD AUTO: 43.2 % (ref 42–52)
HGB BLD-MCNC: 13.9 G/DL (ref 14–18)
HYALINE CASTS #/AREA URNS LPF: ABNORMAL /LPF
IMM GRANULOCYTES # BLD AUTO: 0.05 K/UL (ref 0–0.11)
IMM GRANULOCYTES NFR BLD AUTO: 0.4 % (ref 0–0.9)
KETONES UR STRIP.AUTO-MCNC: ABNORMAL MG/DL
LEUKOCYTE ESTERASE UR QL STRIP.AUTO: NEGATIVE
LYMPHOCYTES # BLD AUTO: 2.52 K/UL (ref 1–4.8)
LYMPHOCYTES NFR BLD: 20.2 % (ref 22–41)
MCH RBC QN AUTO: 26.6 PG (ref 27–33)
MCHC RBC AUTO-ENTMCNC: 32.2 G/DL (ref 32.3–36.5)
MCV RBC AUTO: 82.6 FL (ref 81.4–97.8)
MICRO URNS: ABNORMAL
MONOCYTES # BLD AUTO: 1.09 K/UL (ref 0–0.85)
MONOCYTES NFR BLD AUTO: 8.7 % (ref 0–13.4)
NEUTROPHILS # BLD AUTO: 8.53 K/UL (ref 1.82–7.42)
NEUTROPHILS NFR BLD: 68.4 % (ref 44–72)
NITRITE UR QL STRIP.AUTO: NEGATIVE
NRBC # BLD AUTO: 0 K/UL
NRBC BLD-RTO: 0 /100 WBC (ref 0–0.2)
PH UR STRIP.AUTO: 6 [PH] (ref 5–8)
PLATELET # BLD AUTO: 301 K/UL (ref 164–446)
PMV BLD AUTO: 9.9 FL (ref 9–12.9)
POCT INT CON NEG: NEGATIVE
POCT INT CON POS: POSITIVE
POTASSIUM SERPL-SCNC: 4.3 MMOL/L (ref 3.6–5.5)
PROT SERPL-MCNC: 7.9 G/DL (ref 6–8.2)
PROT UR QL STRIP: 30 MG/DL
PSA SERPL-MCNC: 0.33 NG/ML (ref 0–4)
RBC # BLD AUTO: 5.23 M/UL (ref 4.7–6.1)
RBC # URNS HPF: ABNORMAL /HPF
RBC UR QL AUTO: NEGATIVE
SODIUM SERPL-SCNC: 139 MMOL/L (ref 135–145)
SP GR UR STRIP.AUTO: 1.03
TSH SERPL DL<=0.005 MIU/L-ACNC: 5.73 UIU/ML (ref 0.38–5.33)
UROBILINOGEN UR STRIP.AUTO-MCNC: 1 MG/DL
WBC # BLD AUTO: 12.5 K/UL (ref 4.8–10.8)
WBC #/AREA URNS HPF: ABNORMAL /HPF

## 2023-08-21 PROCEDURE — 84443 ASSAY THYROID STIM HORMONE: CPT

## 2023-08-21 PROCEDURE — 85025 COMPLETE CBC W/AUTO DIFF WBC: CPT

## 2023-08-21 PROCEDURE — 82306 VITAMIN D 25 HYDROXY: CPT

## 2023-08-21 PROCEDURE — 82962 GLUCOSE BLOOD TEST: CPT | Performed by: FAMILY MEDICINE

## 2023-08-21 PROCEDURE — 36415 COLL VENOUS BLD VENIPUNCTURE: CPT

## 2023-08-21 PROCEDURE — 80053 COMPREHEN METABOLIC PANEL: CPT

## 2023-08-21 PROCEDURE — 83036 HEMOGLOBIN GLYCOSYLATED A1C: CPT | Performed by: FAMILY MEDICINE

## 2023-08-21 PROCEDURE — 84153 ASSAY OF PSA TOTAL: CPT

## 2023-08-21 PROCEDURE — 3074F SYST BP LT 130 MM HG: CPT | Performed by: FAMILY MEDICINE

## 2023-08-21 PROCEDURE — 3078F DIAST BP <80 MM HG: CPT | Performed by: FAMILY MEDICINE

## 2023-08-21 PROCEDURE — 81001 URINALYSIS AUTO W/SCOPE: CPT

## 2023-08-21 PROCEDURE — 99204 OFFICE O/P NEW MOD 45 MIN: CPT | Performed by: FAMILY MEDICINE

## 2023-08-21 RX ORDER — SULFAMETHOXAZOLE AND TRIMETHOPRIM 800; 160 MG/1; MG/1
TABLET ORAL
COMMUNITY
Start: 2023-08-12 | End: 2023-08-21

## 2023-08-21 NOTE — PROGRESS NOTES
Subjective:     CC:    Chief Complaint   Patient presents with    Follow-Up       HISTORY OF THE PRESENT ILLNESS: Patient is a 63 y.o. male. This pleasant patient is here today to establish care .  Patient was admitted to [facility] for what sounds like a urinary tract infection and some sepsis.  Patient was discharged .  Since then patient states he has been feeling tired denies any urinary issues.  Patient is a diabetic and states he is currently taking all his medications and not having any issues.    No problem-specific Assessment & Plan notes found for this encounter.      Allergies: Patient has no known allergies.    Current Outpatient Medications Ordered in Epic   Medication Sig Dispense Refill    fluoxetine (PROZAC) 40 MG capsule Take 1 Capsule by mouth every day. 90 Capsule 3    lisinopril (PRINIVIL) 10 MG Tab Take 2 Tablets by mouth every day. 90 Tablet 3    chlorthalidone (HYGROTON) 25 MG Tab Take 1 Tablet by mouth every day. 90 Tablet 3    atorvastatin (LIPITOR) 20 MG Tab Take 1 Tablet by mouth every day. 90 Tablet 3    esomeprazole (NEXIUM) 40 MG delayed-release capsule Take 1 Capsule by mouth every morning before breakfast. 90 Capsule 3    metformin (GLUCOPHAGE) 1000 MG tablet Take 1 Tablet by mouth 2 times a day with meals. 180 Tablet 3     No current Epic-ordered facility-administered medications on file.       Past Medical History:   Diagnosis Date    Diabetes     Hyperlipidemia     Hypertension        Past Surgical History:   Procedure Laterality Date    TRACHEOSTOMY         Social History     Tobacco Use    Smoking status: Former     Packs/day: .5     Types: Cigarettes     Quit date: 1980     Years since quittin.7    Smokeless tobacco: Former     Types: Chew     Quit date: 1980    Tobacco comments:     NOT REALLY SURE WHAT YEAR HE QUIT SMOKING & CHEW   Vaping Use    Vaping Use: Never used   Substance Use Topics    Alcohol use: No    Drug use: Yes     Types: Marijuana        Social History     Social History Narrative    Not on file       Family History   Problem Relation Age of Onset    Stroke Mother     Heart Disease Father     Heart Disease Maternal Grandmother     Heart Disease Maternal Grandfather     Heart Disease Paternal Grandmother     Heart Disease Paternal Grandfather     Cancer Neg Hx        Health Maintenance: Completed    ROS:   Gen: no fevers/chills, no changes in weight  Eyes: no changes in vision  ENT: no sore throat, no hearing loss, no bloody nose  Pulm: no sob, no cough  CV: no chest pain, no palpitations  GI: no nausea/vomiting, no diarrhea  : no dysuria  Neuro: no headaches, no numbness/tingling  Heme/Lymph: no easy bruising      Objective:     Exam: /76 (BP Location: Left arm, Patient Position: Sitting, BP Cuff Size: Large adult)   Pulse (!) 110   Temp 36.7 °C (98.1 °F) (Temporal)   Resp 18   Ht 1.829 m (6')   Wt (!) 152 kg (334 lb 3.2 oz)   SpO2 97%  Body mass index is 45.33 kg/m².    Gen: Alert and oriented, No apparent distress.  Skin: Warm and dry.  No obvious lesions.  Eyes: Sclera wnl Pupils normal in size  Lungs: Normal effort, CTA bilaterally, no wheezes, rhonchi, or rales  CV: Regular rate and rhythm. No murmurs, rubs, or gallops.  Musculoskeletal: Normal gait. No extremity cyanosis, clubbing, or edema.  Neuro: Oriented to person, place and time  Psych: Mood is wnl     Labs: Nonfasting labs were done    Assessment & Plan:   63 y.o. male with the following -    1. Type 2 diabetes mellitus with microalbuminuria, without long-term current use of insulin ([facility])  Patient's blood sugar is 134 hemoglobin A1c is 7.3  - POCT  A1C  - POCT Glucose    2. Dyslipidemia  Patient will be getting his labs done nonfasting but will eventually need to reorder his lipid panel    3. Essential hypertension, benign  Patient's blood pressure is at goal    4. Memory dysfunction  Patient sounds like he does have sleep apnea but he does not want to be referred to  sleep clinic    5. Obstructive sleep apnea syndrome    6. Gastroesophageal reflux disease without esophagitis  Patient is on medication for heartburn    7. Stomach pain  Recommend getting some lab work done on patient also requested his records from his hospitalization at [facility] unfortunately I have no information on his chart  - CBC WITH DIFFERENTIAL; Future  - Comp Metabolic Panel; Future  - URINALYSIS,CULTURE IF INDICATED; Future    8. Screening for prostate cancer  - PROSTATE SPECIFIC AG SCREENING; Future    9. Other fatigue  Check his thyroid  - TSH; Future    10. Vitamin D deficiency  Commend checking his vitamin D level  - VITAMIN D,25 HYDROXY (DEFICIENCY); Future       Return in about 3 weeks (around 9/11/2023), or if symptoms worsen or fail to improve.    Please note that this dictation was created using voice recognition software. I have made every reasonable attempt to correct obvious errors, but I expect that there are errors of grammar and possibly content that I did not discover before finalizing the note.

## 2023-08-21 NOTE — LETTER
[facility]  Rosalie Colby M.D.  [address]  [place] [address]  Fax: [phone]   Authorization for Release/Disclosure of   Protected Health Information   Name: SILVIA CORDERO : 1960 SSN: [id number]   Address: [facility] [address] [place] [address] Phone:    [phone] (home) [phone] (work)   I authorize the entity listed below to release/disclose the PHI below to:   [facility]/Rosalie Colby M.D. and Rosalie Colby M.D.   Provider or Entity Name: [facility]     Address   [facility] [address] [place] [address]    Phone: [phone]      Fax:     Reason for request: continuity of care   Information to be released:    [  ] LAST COLONOSCOPY,  including any PATH REPORT and follow-up  [  ] LAST FIT/COLOGUARD RESULT [  ] LAST DEXA  [  ] LAST MAMMOGRAM  [  ] LAST PAP  [  ] LAST LABS [  ] RETINA EXAM REPORT  [  ] IMMUNIZATION RECORDS  [ XXX ] Release all info  [ XXX ] Last ER Visit      [  ] Check here and initial the line next to each item to release ALL health information INCLUDING  _____ Care and treatment for drug and / or alcohol abuse  _____ HIV testing, infection status, or AIDS  _____ Genetic Testing    DATES OF SERVICE OR TIME PERIOD TO BE DISCLOSED: _____________  I understand and acknowledge that:  * This Authorization may be revoked at any time by you in writing, except if your health information has already been used or disclosed.  * Your health information that will be used or disclosed as a result of you signing this authorization could be re-disclosed by the recipient. If this occurs, your re-disclosed health information may no longer be protected by State or Federal laws.  * You may refuse to sign this Authorization. Your refusal will not affect your ability to obtain treatment.  * This Authorization becomes effective upon signing and will  on (date) __________.      If no date is indicated, this Authorization will  one (1)  year from the signature date.    Name: Ramirez Gregorio Main  Signature: Date:   8/21/2023     PLEASE FAX REQUESTED RECORDS BACK TO: [phone]

## 2023-08-22 DIAGNOSIS — D72.829 LEUKOCYTOSIS, UNSPECIFIED TYPE: ICD-10-CM

## 2023-09-11 ENCOUNTER — HOSPITAL ENCOUNTER (OUTPATIENT)
Dept: LAB | Facility: MEDICAL CENTER | Age: 63
End: 2023-09-11
Attending: FAMILY MEDICINE
Payer: COMMERCIAL

## 2023-09-11 DIAGNOSIS — D72.829 LEUKOCYTOSIS, UNSPECIFIED TYPE: ICD-10-CM

## 2023-09-11 LAB
BASOPHILS # BLD AUTO: 0.9 % (ref 0–1.8)
BASOPHILS # BLD: 0.07 K/UL (ref 0–0.12)
EOSINOPHIL # BLD AUTO: 0.25 K/UL (ref 0–0.51)
EOSINOPHIL NFR BLD: 3.1 % (ref 0–6.9)
ERYTHROCYTE [DISTWIDTH] IN BLOOD BY AUTOMATED COUNT: 45.2 FL (ref 35.9–50)
HCT VFR BLD AUTO: 40.9 % (ref 42–52)
HGB BLD-MCNC: 12.9 G/DL (ref 14–18)
IMM GRANULOCYTES # BLD AUTO: 0.02 K/UL (ref 0–0.11)
IMM GRANULOCYTES NFR BLD AUTO: 0.2 % (ref 0–0.9)
LYMPHOCYTES # BLD AUTO: 1.91 K/UL (ref 1–4.8)
LYMPHOCYTES NFR BLD: 23.5 % (ref 22–41)
MCH RBC QN AUTO: 26.7 PG (ref 27–33)
MCHC RBC AUTO-ENTMCNC: 31.5 G/DL (ref 32.3–36.5)
MCV RBC AUTO: 84.5 FL (ref 81.4–97.8)
MONOCYTES # BLD AUTO: 1 K/UL (ref 0–0.85)
MONOCYTES NFR BLD AUTO: 12.3 % (ref 0–13.4)
NEUTROPHILS # BLD AUTO: 4.87 K/UL (ref 1.82–7.42)
NEUTROPHILS NFR BLD: 60 % (ref 44–72)
NRBC # BLD AUTO: 0 K/UL
NRBC BLD-RTO: 0 /100 WBC (ref 0–0.2)
PLATELET # BLD AUTO: 226 K/UL (ref 164–446)
PMV BLD AUTO: 9.8 FL (ref 9–12.9)
RBC # BLD AUTO: 4.84 M/UL (ref 4.7–6.1)
WBC # BLD AUTO: 8.1 K/UL (ref 4.8–10.8)

## 2023-09-11 PROCEDURE — 85025 COMPLETE CBC W/AUTO DIFF WBC: CPT

## 2023-09-11 PROCEDURE — 36415 COLL VENOUS BLD VENIPUNCTURE: CPT

## 2023-09-13 ENCOUNTER — OFFICE VISIT (OUTPATIENT)
Dept: MEDICAL GROUP | Facility: PHYSICIAN GROUP | Age: 63
End: 2023-09-13
Payer: COMMERCIAL

## 2023-09-13 VITALS
TEMPERATURE: 97 F | OXYGEN SATURATION: 98 % | HEIGHT: 72 IN | HEART RATE: 91 BPM | WEIGHT: 315 LBS | SYSTOLIC BLOOD PRESSURE: 128 MMHG | DIASTOLIC BLOOD PRESSURE: 66 MMHG | BODY MASS INDEX: 42.66 KG/M2 | RESPIRATION RATE: 18 BRPM

## 2023-09-13 DIAGNOSIS — R79.89 ABNORMAL CBC: ICD-10-CM

## 2023-09-13 DIAGNOSIS — E03.8 OTHER SPECIFIED HYPOTHYROIDISM: ICD-10-CM

## 2023-09-13 DIAGNOSIS — R80.9 TYPE 2 DIABETES MELLITUS WITH MICROALBUMINURIA, WITHOUT LONG-TERM CURRENT USE OF INSULIN (HCC): ICD-10-CM

## 2023-09-13 DIAGNOSIS — E55.9 VITAMIN D DEFICIENCY: ICD-10-CM

## 2023-09-13 DIAGNOSIS — E11.29 TYPE 2 DIABETES MELLITUS WITH MICROALBUMINURIA, WITHOUT LONG-TERM CURRENT USE OF INSULIN (HCC): ICD-10-CM

## 2023-09-13 PROCEDURE — 3078F DIAST BP <80 MM HG: CPT | Performed by: FAMILY MEDICINE

## 2023-09-13 PROCEDURE — 3074F SYST BP LT 130 MM HG: CPT | Performed by: FAMILY MEDICINE

## 2023-09-13 PROCEDURE — 99214 OFFICE O/P EST MOD 30 MIN: CPT | Performed by: FAMILY MEDICINE

## 2023-09-13 RX ORDER — LEVOTHYROXINE SODIUM 0.03 MG/1
25 TABLET ORAL
Qty: 90 TABLET | Refills: 0 | Status: SHIPPED | OUTPATIENT
Start: 2023-09-13 | End: 2023-11-14 | Stop reason: SDUPTHER

## 2023-09-13 ASSESSMENT — FIBROSIS 4 INDEX: FIB4 SCORE: 0.92

## 2023-09-13 NOTE — PROGRESS NOTES
Subjective:     CC:   Chief Complaint   Patient presents with    Follow-Up       HPI:   Ramirez presents today for follow-up of his labs.  I had repeated his CBC due to the fact last 1 was elevated at this time it is within normal limits.  Patient feels he is doing well.    Past Medical History:   Diagnosis Date    Diabetes     Hyperlipidemia     Hypertension        Social History     Tobacco Use    Smoking status: Former     Current packs/day: 0.00     Types: Cigarettes     Quit date: 1980     Years since quittin.8    Smokeless tobacco: Former     Types: Chew     Quit date: 1980    Tobacco comments:     NOT REALLY SURE WHAT YEAR HE QUIT SMOKING & CHEW   Vaping Use    Vaping Use: Never used   Substance Use Topics    Alcohol use: No    Drug use: Yes     Types: Marijuana       Current Outpatient Medications Ordered in Epic   Medication Sig Dispense Refill    levothyroxine (SYNTHROID) 25 MCG Tab Take 1 Tablet by mouth every morning on an empty stomach. 90 Tablet 0    fluoxetine (PROZAC) 40 MG capsule Take 1 Capsule by mouth every day. 90 Capsule 3    lisinopril (PRINIVIL) 10 MG Tab Take 2 Tablets by mouth every day. 90 Tablet 3    chlorthalidone (HYGROTON) 25 MG Tab Take 1 Tablet by mouth every day. 90 Tablet 3    atorvastatin (LIPITOR) 20 MG Tab Take 1 Tablet by mouth every day. 90 Tablet 3    esomeprazole (NEXIUM) 40 MG delayed-release capsule Take 1 Capsule by mouth every morning before breakfast. 90 Capsule 3    metformin (GLUCOPHAGE) 1000 MG tablet Take 1 Tablet by mouth 2 times a day with meals. 180 Tablet 3     No current Epic-ordered facility-administered medications on file.       Allergies:  Patient has no known allergies.    Health Maintenance: Completed    ROS:  Gen: no fevers/chills, patient has gained some weight  Eyes: no changes in vision  ENT: no sore throat, no hearing loss, no bloody nose  Pulm: no sob, no cough  CV: no chest pain, no palpitations  GI: no nausea/vomiting, no  diarrhea  : no dysuria  Neuro: no headaches, no numbness/tingling  Heme/Lymph: no easy bruising    Objective:     Exam:  /66 (BP Location: Left arm, Patient Position: Sitting, BP Cuff Size: Large adult)   Pulse 91   Temp 36.1 °C (97 °F) (Temporal)   Resp 18   Ht 1.829 m (6')   Wt (!) 153 kg (337 lb 3.2 oz)   SpO2 98%   BMI 45.73 kg/m²  Body mass index is 45.73 kg/m².    Gen: Alert and oriented, No apparent distress.  Skin: Warm and dry.  No obvious lesions.  Eyes: Sclera wnl Pupils normal in size  Lungs: Normal effort, CTA bilaterally, no wheezes, rhonchi, or rales  CV: Regular rate and rhythm. No murmurs, rubs, or gallops.  Musculoskeletal: Normal gait. No extremity cyanosis, clubbing, or edema.  Neuro: Oriented to person, place and time  Psych: Mood is wnl       Assessment & Plan:     63 y.o. male with the following -     1. Other specified hypothyroidism  Patient's TSH is slightly elevated would recommend putting him on low-dose thyroid medication we will repeat this again in about 6 to 8 weeks patient was agreeable with this plan    2. Vitamin D deficiency  Patient's vitamin D is extremely low recommend 5000 IUs of vitamin D3 per day patient's wife will go ahead and purchase this.    3. Type 2 diabetes mellitus with microalbuminuria, without long-term current use of insulin ([facility])  Patient's last hemoglobin A1c was 7.3 we will repeat this again patient at this time was offered pharmacotherapy referral for better management the patient at this time would like to work on his weight and what he is eating.    Other orders  - levothyroxine (SYNTHROID) 25 MCG Tab; Take 1 Tablet by mouth every morning on an empty stomach.  Dispense: 90 Tablet; Refill: 0       Return in about 10 weeks (around 11/22/2023), or if symptoms worsen or fail to improve.    Please note that this dictation was created using voice recognition software. I have made every reasonable attempt to correct obvious errors, but I expect that  there are errors of grammar and possibly content that I did not discover before finalizing the note.

## 2023-11-10 ENCOUNTER — HOSPITAL ENCOUNTER (OUTPATIENT)
Dept: LAB | Facility: MEDICAL CENTER | Age: 63
End: 2023-11-10
Attending: FAMILY MEDICINE
Payer: COMMERCIAL

## 2023-11-10 DIAGNOSIS — R79.89 ABNORMAL CBC: ICD-10-CM

## 2023-11-10 DIAGNOSIS — E03.8 OTHER SPECIFIED HYPOTHYROIDISM: ICD-10-CM

## 2023-11-10 DIAGNOSIS — R80.9 TYPE 2 DIABETES MELLITUS WITH MICROALBUMINURIA, WITHOUT LONG-TERM CURRENT USE OF INSULIN (HCC): ICD-10-CM

## 2023-11-10 DIAGNOSIS — E11.29 TYPE 2 DIABETES MELLITUS WITH MICROALBUMINURIA, WITHOUT LONG-TERM CURRENT USE OF INSULIN (HCC): ICD-10-CM

## 2023-11-10 DIAGNOSIS — E55.9 VITAMIN D DEFICIENCY: ICD-10-CM

## 2023-11-10 LAB
25(OH)D3 SERPL-MCNC: 18 NG/ML (ref 30–100)
ALBUMIN SERPL BCP-MCNC: 4 G/DL (ref 3.2–4.9)
ALBUMIN/GLOB SERPL: 1 G/DL
ALP SERPL-CCNC: 83 U/L (ref 30–99)
ALT SERPL-CCNC: 23 U/L (ref 2–50)
ANION GAP SERPL CALC-SCNC: 14 MMOL/L (ref 7–16)
AST SERPL-CCNC: 14 U/L (ref 12–45)
BASOPHILS # BLD AUTO: 0.6 % (ref 0–1.8)
BASOPHILS # BLD: 0.06 K/UL (ref 0–0.12)
BILIRUB SERPL-MCNC: 0.5 MG/DL (ref 0.1–1.5)
BUN SERPL-MCNC: 12 MG/DL (ref 8–22)
CALCIUM ALBUM COR SERPL-MCNC: 9.4 MG/DL (ref 8.5–10.5)
CALCIUM SERPL-MCNC: 9.4 MG/DL (ref 8.5–10.5)
CHLORIDE SERPL-SCNC: 102 MMOL/L (ref 96–112)
CO2 SERPL-SCNC: 22 MMOL/L (ref 20–33)
CREAT SERPL-MCNC: 0.75 MG/DL (ref 0.5–1.4)
EOSINOPHIL # BLD AUTO: 0.16 K/UL (ref 0–0.51)
EOSINOPHIL NFR BLD: 1.7 % (ref 0–6.9)
ERYTHROCYTE [DISTWIDTH] IN BLOOD BY AUTOMATED COUNT: 39.6 FL (ref 35.9–50)
GFR SERPLBLD CREATININE-BSD FMLA CKD-EPI: 102 ML/MIN/1.73 M 2
GLOBULIN SER CALC-MCNC: 4 G/DL (ref 1.9–3.5)
GLUCOSE SERPL-MCNC: 149 MG/DL (ref 65–99)
HCT VFR BLD AUTO: 42.5 % (ref 42–52)
HGB BLD-MCNC: 13.4 G/DL (ref 14–18)
IMM GRANULOCYTES # BLD AUTO: 0.02 K/UL (ref 0–0.11)
IMM GRANULOCYTES NFR BLD AUTO: 0.2 % (ref 0–0.9)
LYMPHOCYTES # BLD AUTO: 1.43 K/UL (ref 1–4.8)
LYMPHOCYTES NFR BLD: 15.5 % (ref 22–41)
MCH RBC QN AUTO: 25.6 PG (ref 27–33)
MCHC RBC AUTO-ENTMCNC: 31.5 G/DL (ref 32.3–36.5)
MCV RBC AUTO: 81.3 FL (ref 81.4–97.8)
MONOCYTES # BLD AUTO: 0.77 K/UL (ref 0–0.85)
MONOCYTES NFR BLD AUTO: 8.3 % (ref 0–13.4)
NEUTROPHILS # BLD AUTO: 6.8 K/UL (ref 1.82–7.42)
NEUTROPHILS NFR BLD: 73.7 % (ref 44–72)
NRBC # BLD AUTO: 0 K/UL
NRBC BLD-RTO: 0 /100 WBC (ref 0–0.2)
PLATELET # BLD AUTO: 235 K/UL (ref 164–446)
PMV BLD AUTO: 10.7 FL (ref 9–12.9)
POTASSIUM SERPL-SCNC: 3.6 MMOL/L (ref 3.6–5.5)
PROT SERPL-MCNC: 8 G/DL (ref 6–8.2)
RBC # BLD AUTO: 5.23 M/UL (ref 4.7–6.1)
SODIUM SERPL-SCNC: 138 MMOL/L (ref 135–145)
TSH SERPL DL<=0.005 MIU/L-ACNC: 4.47 UIU/ML (ref 0.38–5.33)
WBC # BLD AUTO: 9.2 K/UL (ref 4.8–10.8)

## 2023-11-10 PROCEDURE — 80053 COMPREHEN METABOLIC PANEL: CPT

## 2023-11-10 PROCEDURE — 84443 ASSAY THYROID STIM HORMONE: CPT

## 2023-11-10 PROCEDURE — 82306 VITAMIN D 25 HYDROXY: CPT

## 2023-11-10 PROCEDURE — 36415 COLL VENOUS BLD VENIPUNCTURE: CPT

## 2023-11-10 PROCEDURE — 85025 COMPLETE CBC W/AUTO DIFF WBC: CPT

## 2023-11-14 ENCOUNTER — OFFICE VISIT (OUTPATIENT)
Dept: MEDICAL GROUP | Facility: PHYSICIAN GROUP | Age: 63
End: 2023-11-14
Payer: COMMERCIAL

## 2023-11-14 VITALS
SYSTOLIC BLOOD PRESSURE: 136 MMHG | WEIGHT: 315 LBS | TEMPERATURE: 96.6 F | BODY MASS INDEX: 42.66 KG/M2 | HEART RATE: 108 BPM | DIASTOLIC BLOOD PRESSURE: 80 MMHG | RESPIRATION RATE: 18 BRPM | HEIGHT: 72 IN | OXYGEN SATURATION: 96 %

## 2023-11-14 DIAGNOSIS — E78.5 HYPERLIPIDEMIA, UNSPECIFIED HYPERLIPIDEMIA TYPE: ICD-10-CM

## 2023-11-14 DIAGNOSIS — E11.29 TYPE 2 DIABETES MELLITUS WITH MICROALBUMINURIA, WITHOUT LONG-TERM CURRENT USE OF INSULIN (HCC): ICD-10-CM

## 2023-11-14 DIAGNOSIS — I10 ESSENTIAL HYPERTENSION, BENIGN: ICD-10-CM

## 2023-11-14 DIAGNOSIS — R80.9 TYPE 2 DIABETES MELLITUS WITH MICROALBUMINURIA, WITHOUT LONG-TERM CURRENT USE OF INSULIN (HCC): ICD-10-CM

## 2023-11-14 DIAGNOSIS — E03.8 OTHER SPECIFIED HYPOTHYROIDISM: ICD-10-CM

## 2023-11-14 DIAGNOSIS — F41.9 ANXIETY: ICD-10-CM

## 2023-11-14 DIAGNOSIS — E55.9 VITAMIN D DEFICIENCY: ICD-10-CM

## 2023-11-14 PROCEDURE — 3075F SYST BP GE 130 - 139MM HG: CPT | Performed by: FAMILY MEDICINE

## 2023-11-14 PROCEDURE — 92250 FUNDUS PHOTOGRAPHY W/I&R: CPT | Mod: TC | Performed by: FAMILY MEDICINE

## 2023-11-14 PROCEDURE — 3079F DIAST BP 80-89 MM HG: CPT | Performed by: FAMILY MEDICINE

## 2023-11-14 PROCEDURE — 99214 OFFICE O/P EST MOD 30 MIN: CPT | Performed by: FAMILY MEDICINE

## 2023-11-14 RX ORDER — LEVOTHYROXINE SODIUM 0.03 MG/1
25 TABLET ORAL
Qty: 90 TABLET | Refills: 2 | Status: SHIPPED | OUTPATIENT
Start: 2023-11-14 | End: 2024-01-15 | Stop reason: SDUPTHER

## 2023-11-14 ASSESSMENT — FIBROSIS 4 INDEX: FIB4 SCORE: 0.76

## 2023-11-14 NOTE — PROGRESS NOTES
Subjective:     CC:   Chief Complaint   Patient presents with    Follow-Up       HPI:   Ramirez presents today for follow-up of his labs.  Patient is working to control his sugars and to lose weight.  Patient feels good otherwise and has no complaints.  Patient here to follow-up on his lab results    Past Medical History:   Diagnosis Date    Diabetes     Hyperlipidemia     Hypertension        Social History     Tobacco Use    Smoking status: Former     Current packs/day: 0.00     Types: Cigarettes     Quit date: 1980     Years since quittin.9    Smokeless tobacco: Former     Types: Chew     Quit date: 1980    Tobacco comments:     NOT REALLY SURE WHAT YEAR HE QUIT SMOKING & CHEW   Vaping Use    Vaping Use: Never used   Substance Use Topics    Alcohol use: No    Drug use: Yes     Types: Marijuana       Current Outpatient Medications Ordered in Epic   Medication Sig Dispense Refill    levothyroxine (SYNTHROID) 25 MCG Tab Take 1 Tablet by mouth every morning on an empty stomach. 90 Tablet 2    fluoxetine (PROZAC) 40 MG capsule Take 1 Capsule by mouth every day. 90 Capsule 3    lisinopril (PRINIVIL) 10 MG Tab Take 2 Tablets by mouth every day. 90 Tablet 3    chlorthalidone (HYGROTON) 25 MG Tab Take 1 Tablet by mouth every day. 90 Tablet 3    atorvastatin (LIPITOR) 20 MG Tab Take 1 Tablet by mouth every day. 90 Tablet 3    esomeprazole (NEXIUM) 40 MG delayed-release capsule Take 1 Capsule by mouth every morning before breakfast. 90 Capsule 3    metformin (GLUCOPHAGE) 1000 MG tablet Take 1 Tablet by mouth 2 times a day with meals. 180 Tablet 3     No current Epic-ordered facility-administered medications on file.       Allergies:  Patient has no known allergies.    Health Maintenance: Completed    ROS:  Gen: no fevers/chills, patient has lost 3 pounds in 2 months  Eyes: no changes in vision  ENT: no sore throat, no hearing loss, no bloody nose  Pulm: no sob, no cough  CV: no chest pain, no palpitations  GI:  no nausea/vomiting, no diarrhea  : no dysuria  Neuro: no headaches, no numbness/tingling  Heme/Lymph: no easy bruising    Objective:     Exam:  /80 (BP Location: Right arm, Patient Position: Sitting, BP Cuff Size: Large adult)   Pulse (!) 108   Temp 35.9 °C (96.6 °F) (Temporal)   Resp 18   Ht 1.829 m (6')   Wt (!) 152 kg (334 lb 3.2 oz)   SpO2 96%   BMI 45.33 kg/m²  Body mass index is 45.33 kg/m².    Gen: Alert and oriented, No apparent distress.  Skin: Warm and dry.  No obvious lesions.  Eyes: Sclera wnl Pupils normal in size  Lungs: Normal effort, CTA bilaterally, no wheezes, rhonchi, or rales  CV: Regular rate and rhythm. No murmurs, rubs, or gallops.  Musculoskeletal: Normal gait. No extremity cyanosis, clubbing, or edema.  Neuro: Oriented to person, place and time  Psych: Mood is wnl       Assessment & Plan:     63 y.o. male with the following -     1. Type 2 diabetes mellitus with microalbuminuria, without long-term current use of insulin (HCC)  I would recommend continue present medication we will repeat his labs again in January patient agreeable with the plan  - POCT Retinal Eye Exam    2. Vitamin D deficiency  I would recommend patient start taking 5000 IUs of vitamin D3 per day we will recheck this again before I see him back in January    3. Other specified hypothyroidism  Patient's TSH is within normal limits we will continue present medication    4. Essential hypertension, benign  Patient's blood pressure looks at goal continue present medication    5. Anxiety  Patient is doing well on the Prozac at this time we will continue this    Other orders  - levothyroxine (SYNTHROID) 25 MCG Tab; Take 1 Tablet by mouth every morning on an empty stomach.  Dispense: 90 Tablet; Refill: 2       Return in about 2 months (around 1/14/2024), or if symptoms worsen or fail to improve.    Please note that this dictation was created using voice recognition software. I have made every reasonable attempt to  correct obvious errors, but I expect that there are errors of grammar and possibly content that I did not discover before finalizing the note.

## 2023-11-15 LAB — RETINAL SCREEN: NEGATIVE

## 2024-01-11 ENCOUNTER — HOSPITAL ENCOUNTER (OUTPATIENT)
Dept: LAB | Facility: MEDICAL CENTER | Age: 64
End: 2024-01-11
Attending: FAMILY MEDICINE
Payer: COMMERCIAL

## 2024-01-11 DIAGNOSIS — E78.5 HYPERLIPIDEMIA, UNSPECIFIED HYPERLIPIDEMIA TYPE: ICD-10-CM

## 2024-01-11 DIAGNOSIS — R80.9 TYPE 2 DIABETES MELLITUS WITH MICROALBUMINURIA, WITHOUT LONG-TERM CURRENT USE OF INSULIN (HCC): ICD-10-CM

## 2024-01-11 DIAGNOSIS — E11.29 TYPE 2 DIABETES MELLITUS WITH MICROALBUMINURIA, WITHOUT LONG-TERM CURRENT USE OF INSULIN (HCC): ICD-10-CM

## 2024-01-11 DIAGNOSIS — E55.9 VITAMIN D DEFICIENCY: ICD-10-CM

## 2024-01-11 LAB
25(OH)D3 SERPL-MCNC: 42 NG/ML (ref 30–100)
ALBUMIN SERPL BCP-MCNC: 4.1 G/DL (ref 3.2–4.9)
ALBUMIN/GLOB SERPL: 1.1 G/DL
ALP SERPL-CCNC: 80 U/L (ref 30–99)
ALT SERPL-CCNC: 15 U/L (ref 2–50)
ANION GAP SERPL CALC-SCNC: 15 MMOL/L (ref 7–16)
AST SERPL-CCNC: 14 U/L (ref 12–45)
BILIRUB SERPL-MCNC: 0.5 MG/DL (ref 0.1–1.5)
BUN SERPL-MCNC: 12 MG/DL (ref 8–22)
CALCIUM ALBUM COR SERPL-MCNC: 9.1 MG/DL (ref 8.5–10.5)
CALCIUM SERPL-MCNC: 9.2 MG/DL (ref 8.5–10.5)
CHLORIDE SERPL-SCNC: 101 MMOL/L (ref 96–112)
CHOLEST SERPL-MCNC: 177 MG/DL (ref 100–199)
CO2 SERPL-SCNC: 25 MMOL/L (ref 20–33)
CREAT SERPL-MCNC: 0.79 MG/DL (ref 0.5–1.4)
CREAT UR-MCNC: 206.93 MG/DL
GFR SERPLBLD CREATININE-BSD FMLA CKD-EPI: 101 ML/MIN/1.73 M 2
GLOBULIN SER CALC-MCNC: 3.7 G/DL (ref 1.9–3.5)
GLUCOSE SERPL-MCNC: 146 MG/DL (ref 65–99)
HDLC SERPL-MCNC: 47 MG/DL
LDLC SERPL CALC-MCNC: 105 MG/DL
MICROALBUMIN UR-MCNC: 21.8 MG/DL
MICROALBUMIN/CREAT UR: 105 MG/G (ref 0–30)
POTASSIUM SERPL-SCNC: 3.5 MMOL/L (ref 3.6–5.5)
PROT SERPL-MCNC: 7.8 G/DL (ref 6–8.2)
SODIUM SERPL-SCNC: 141 MMOL/L (ref 135–145)
TRIGL SERPL-MCNC: 125 MG/DL (ref 0–149)

## 2024-01-11 PROCEDURE — 80061 LIPID PANEL: CPT

## 2024-01-11 PROCEDURE — 82306 VITAMIN D 25 HYDROXY: CPT

## 2024-01-11 PROCEDURE — 36415 COLL VENOUS BLD VENIPUNCTURE: CPT

## 2024-01-11 PROCEDURE — 80053 COMPREHEN METABOLIC PANEL: CPT

## 2024-01-11 PROCEDURE — 82570 ASSAY OF URINE CREATININE: CPT

## 2024-01-11 PROCEDURE — 82043 UR ALBUMIN QUANTITATIVE: CPT

## 2024-01-15 ENCOUNTER — OFFICE VISIT (OUTPATIENT)
Dept: MEDICAL GROUP | Facility: PHYSICIAN GROUP | Age: 64
End: 2024-01-15
Payer: COMMERCIAL

## 2024-01-15 VITALS
HEART RATE: 100 BPM | HEIGHT: 72 IN | WEIGHT: 315 LBS | BODY MASS INDEX: 42.66 KG/M2 | TEMPERATURE: 97.2 F | OXYGEN SATURATION: 97 % | SYSTOLIC BLOOD PRESSURE: 136 MMHG | RESPIRATION RATE: 18 BRPM | DIASTOLIC BLOOD PRESSURE: 84 MMHG

## 2024-01-15 DIAGNOSIS — R41.3 MEMORY DYSFUNCTION: ICD-10-CM

## 2024-01-15 DIAGNOSIS — E11.9 TYPE 2 DIABETES MELLITUS WITHOUT COMPLICATION, WITHOUT LONG-TERM CURRENT USE OF INSULIN (HCC): ICD-10-CM

## 2024-01-15 DIAGNOSIS — I10 ESSENTIAL HYPERTENSION, BENIGN: ICD-10-CM

## 2024-01-15 DIAGNOSIS — F41.9 ANXIETY: ICD-10-CM

## 2024-01-15 DIAGNOSIS — R80.9 TYPE 2 DIABETES MELLITUS WITH MICROALBUMINURIA, WITHOUT LONG-TERM CURRENT USE OF INSULIN (HCC): ICD-10-CM

## 2024-01-15 DIAGNOSIS — E78.5 DYSLIPIDEMIA: ICD-10-CM

## 2024-01-15 DIAGNOSIS — K21.9 GASTROESOPHAGEAL REFLUX DISEASE WITHOUT ESOPHAGITIS: ICD-10-CM

## 2024-01-15 DIAGNOSIS — E11.29 TYPE 2 DIABETES MELLITUS WITH MICROALBUMINURIA, WITHOUT LONG-TERM CURRENT USE OF INSULIN (HCC): ICD-10-CM

## 2024-01-15 LAB
HBA1C MFR BLD: 7 % (ref ?–5.8)
POCT INT CON NEG: NEGATIVE
POCT INT CON POS: POSITIVE

## 2024-01-15 PROCEDURE — 3075F SYST BP GE 130 - 139MM HG: CPT | Performed by: FAMILY MEDICINE

## 2024-01-15 PROCEDURE — 3079F DIAST BP 80-89 MM HG: CPT | Performed by: FAMILY MEDICINE

## 2024-01-15 PROCEDURE — 99214 OFFICE O/P EST MOD 30 MIN: CPT | Performed by: FAMILY MEDICINE

## 2024-01-15 PROCEDURE — 83036 HEMOGLOBIN GLYCOSYLATED A1C: CPT | Performed by: FAMILY MEDICINE

## 2024-01-15 RX ORDER — LEVOTHYROXINE SODIUM 0.03 MG/1
25 TABLET ORAL
Qty: 90 TABLET | Refills: 2 | Status: SHIPPED | OUTPATIENT
Start: 2024-01-15

## 2024-01-15 RX ORDER — CHLORTHALIDONE 25 MG/1
25 TABLET ORAL DAILY
Qty: 90 TABLET | Refills: 3 | Status: SHIPPED | OUTPATIENT
Start: 2024-01-15

## 2024-01-15 RX ORDER — ATORVASTATIN CALCIUM 20 MG/1
20 TABLET, FILM COATED ORAL DAILY
Qty: 90 TABLET | Refills: 3 | Status: SHIPPED | OUTPATIENT
Start: 2024-01-15

## 2024-01-15 RX ORDER — FLUOXETINE HYDROCHLORIDE 40 MG/1
40 CAPSULE ORAL DAILY
Qty: 90 CAPSULE | Refills: 3 | Status: SHIPPED | OUTPATIENT
Start: 2024-01-15

## 2024-01-15 RX ORDER — ESOMEPRAZOLE MAGNESIUM 40 MG/1
40 CAPSULE, DELAYED RELEASE ORAL
Qty: 90 CAPSULE | Refills: 3 | Status: SHIPPED | OUTPATIENT
Start: 2024-01-15

## 2024-01-15 ASSESSMENT — FIBROSIS 4 INDEX: FIB4 SCORE: 0.94

## 2024-01-15 ASSESSMENT — PATIENT HEALTH QUESTIONNAIRE - PHQ9: CLINICAL INTERPRETATION OF PHQ2 SCORE: 0

## 2024-01-15 NOTE — PROGRESS NOTES
Subjective:     CC:   Chief Complaint   Patient presents with    Follow-Up       HPI:   Ramirez presents today for follow-up of his labs.  Patient feels he is doing well.  Patient has had a history of sleep apnea patient does not want to use a CPAP machine he states he is sleeping on his recliner with his head slightly elevated and he has not had no issues at all.    Past Medical History:   Diagnosis Date    Diabetes     Hyperlipidemia     Hypertension        Social History     Tobacco Use    Smoking status: Former     Current packs/day: 0.00     Types: Cigarettes     Quit date: 1980     Years since quittin.1    Smokeless tobacco: Former     Types: Chew     Quit date: 1980    Tobacco comments:     NOT REALLY SURE WHAT YEAR HE QUIT SMOKING & CHEW   Vaping Use    Vaping Use: Never used   Substance Use Topics    Alcohol use: No    Drug use: Yes     Types: Marijuana       Current Outpatient Medications Ordered in Epic   Medication Sig Dispense Refill    atorvastatin (LIPITOR) 20 MG Tab Take 1 Tablet by mouth every day. 90 Tablet 3    chlorthalidone (HYGROTON) 25 MG Tab Take 1 Tablet by mouth every day. 90 Tablet 3    esomeprazole (NEXIUM) 40 MG delayed-release capsule Take 1 Capsule by mouth every morning before breakfast. 90 Capsule 3    fluoxetine (PROZAC) 40 MG capsule Take 1 Capsule by mouth every day. 90 Capsule 3    levothyroxine (SYNTHROID) 25 MCG Tab Take 1 Tablet by mouth every morning on an empty stomach. 90 Tablet 2    metformin (GLUCOPHAGE) 1000 MG tablet Take 1 Tablet by mouth 2 times a day with meals. 180 Tablet 3    lisinopril (PRINIVIL) 10 MG Tab Take 2 Tablets by mouth every day. 90 Tablet 3     No current Epic-ordered facility-administered medications on file.       Allergies:  Patient has no known allergies.    Health Maintenance: Completed    ROS:  Gen: no fevers/chills, patient has lost 3 pounds in 4 months  Eyes: no changes in vision  ENT: no sore throat, no hearing loss, no bloody  nose  Pulm: no sob, no cough  CV: no chest pain, no palpitations  GI: no nausea/vomiting, no diarrhea  : no dysuria  Neuro: no headaches, no numbness/tingling  Heme/Lymph: no easy bruising    Objective:     Exam:  /84 (BP Location: Right arm, Patient Position: Sitting, BP Cuff Size: Large adult)   Pulse 100   Temp 36.2 °C (97.2 °F) (Temporal)   Resp 18   Ht 1.829 m (6')   Wt (!) 152 kg (334 lb 3.2 oz)   SpO2 97%   BMI 45.33 kg/m²  Body mass index is 45.33 kg/m².    Gen: Alert and oriented, No apparent distress.  Skin: Warm and dry.  No obvious lesions.  Eyes: Sclera wnl Pupils normal in size  Lungs: Normal effort, CTA bilaterally, no wheezes, rhonchi, or rales  CV: Regular rate and rhythm. No murmurs, rubs, or gallops.  Musculoskeletal: Normal gait. No extremity cyanosis, clubbing, or edema.  Neuro: Oriented to person, place and time  Psych: Mood is wnl         Assessment & Plan:     63 y.o. male with the following -     1. Type 2 diabetes mellitus with microalbuminuria, without long-term current use of insulin (HCC)  Patient's hemoglobin A1c is 7 I would like it lower patient's microalbumin slightly elevated.  I did mention to patient referring him to pharmacotherapy at this time patient wants just to work on his diabetes and work on his weight himself.  - POCT  A1C    2. Dyslipidemia  Patient's lipid panel looks better we will continue to monitor this and continue his present medication  - atorvastatin (LIPITOR) 20 MG Tab; Take 1 Tablet by mouth every day.  Dispense: 90 Tablet; Refill: 3    3. Essential hypertension, benign  Patient's blood pressure looks at goal continue present medication  - chlorthalidone (HYGROTON) 25 MG Tab; Take 1 Tablet by mouth every day.  Dispense: 90 Tablet; Refill: 3  - fluoxetine (PROZAC) 40 MG capsule; Take 1 Capsule by mouth every day.  Dispense: 90 Capsule; Refill: 3    4. Gastroesophageal reflux disease without esophagitis  Patient feels the Nexium is helping him we  will continue this for now  - esomeprazole (NEXIUM) 40 MG delayed-release capsule; Take 1 Capsule by mouth every morning before breakfast.  Dispense: 90 Capsule; Refill: 3    5. Anxiety  Patient feels the Prozac is helping him we will continue this for now  - fluoxetine (PROZAC) 40 MG capsule; Take 1 Capsule by mouth every day.  Dispense: 90 Capsule; Refill: 3    6. Memory dysfunction  - fluoxetine (PROZAC) 40 MG capsule; Take 1 Capsule by mouth every day.  Dispense: 90 Capsule; Refill: 3      - metformin (GLUCOPHAGE) 1000 MG tablet; Take 1 Tablet by mouth 2 times a day with meals.  Dispense: 180 Tablet; Refill: 3    Other orders  - levothyroxine (SYNTHROID) 25 MCG Tab; Take 1 Tablet by mouth every morning on an empty stomach.  Dispense: 90 Tablet; Refill: 2       Return in about 3 months (around 4/15/2024), or if symptoms worsen or fail to improve.    Please note that this dictation was created using voice recognition software. I have made every reasonable attempt to correct obvious errors, but I expect that there are errors of grammar and possibly content that I did not discover before finalizing the note.

## 2024-04-16 ENCOUNTER — OFFICE VISIT (OUTPATIENT)
Dept: MEDICAL GROUP | Facility: PHYSICIAN GROUP | Age: 64
End: 2024-04-16
Payer: COMMERCIAL

## 2024-04-16 VITALS
TEMPERATURE: 96.5 F | HEART RATE: 102 BPM | SYSTOLIC BLOOD PRESSURE: 134 MMHG | HEIGHT: 72 IN | WEIGHT: 315 LBS | DIASTOLIC BLOOD PRESSURE: 76 MMHG | OXYGEN SATURATION: 95 % | BODY MASS INDEX: 42.66 KG/M2 | RESPIRATION RATE: 18 BRPM

## 2024-04-16 DIAGNOSIS — E11.29 TYPE 2 DIABETES MELLITUS WITH MICROALBUMINURIA, WITHOUT LONG-TERM CURRENT USE OF INSULIN (HCC): ICD-10-CM

## 2024-04-16 DIAGNOSIS — R80.9 TYPE 2 DIABETES MELLITUS WITH MICROALBUMINURIA, WITHOUT LONG-TERM CURRENT USE OF INSULIN (HCC): ICD-10-CM

## 2024-04-16 DIAGNOSIS — R10.12 LEFT UPPER QUADRANT ABDOMINAL PAIN: ICD-10-CM

## 2024-04-16 LAB
HBA1C MFR BLD: 7.1 % (ref ?–5.8)
POCT INT CON NEG: NEGATIVE
POCT INT CON POS: POSITIVE

## 2024-04-16 PROCEDURE — 99214 OFFICE O/P EST MOD 30 MIN: CPT | Performed by: FAMILY MEDICINE

## 2024-04-16 PROCEDURE — 83036 HEMOGLOBIN GLYCOSYLATED A1C: CPT | Performed by: FAMILY MEDICINE

## 2024-04-16 PROCEDURE — 3078F DIAST BP <80 MM HG: CPT | Performed by: FAMILY MEDICINE

## 2024-04-16 PROCEDURE — 3075F SYST BP GE 130 - 139MM HG: CPT | Performed by: FAMILY MEDICINE

## 2024-04-16 ASSESSMENT — FIBROSIS 4 INDEX: FIB4 SCORE: 0.95

## 2024-04-16 NOTE — PROGRESS NOTES
Subjective:     CC:   Chief Complaint   Patient presents with    Follow-Up       HPI:   Ramirez presents today with his wife for follow-up of his hemoglobin A1c.  His wife brought up that he has been having left-sided abdominal pain since his sepsis in August.  Patient did mention that it is only in the morning but his wife says it is all day long.  Patient states it comes and goes.  The only thing it impacts is he cannot do his abdominal crunches.  Patient admits that he has not been active due to the cold weather.  Patient denies any problems eating or having bowel movements or urinating.    Past Medical History:   Diagnosis Date    Diabetes     Hyperlipidemia     Hypertension        Social History     Tobacco Use    Smoking status: Former     Current packs/day: 0.00     Types: Cigarettes     Quit date: 1980     Years since quittin.3    Smokeless tobacco: Former     Types: Chew     Quit date: 1980    Tobacco comments:     NOT REALLY SURE WHAT YEAR HE QUIT SMOKING & CHEW   Vaping Use    Vaping Use: Never used   Substance Use Topics    Alcohol use: No    Drug use: Yes     Types: Marijuana       Current Outpatient Medications Ordered in Epic   Medication Sig Dispense Refill    atorvastatin (LIPITOR) 20 MG Tab Take 1 Tablet by mouth every day. 90 Tablet 3    chlorthalidone (HYGROTON) 25 MG Tab Take 1 Tablet by mouth every day. 90 Tablet 3    esomeprazole (NEXIUM) 40 MG delayed-release capsule Take 1 Capsule by mouth every morning before breakfast. 90 Capsule 3    fluoxetine (PROZAC) 40 MG capsule Take 1 Capsule by mouth every day. 90 Capsule 3    levothyroxine (SYNTHROID) 25 MCG Tab Take 1 Tablet by mouth every morning on an empty stomach. 90 Tablet 2    metformin (GLUCOPHAGE) 1000 MG tablet Take 1 Tablet by mouth 2 times a day with meals. 180 Tablet 3    lisinopril (PRINIVIL) 10 MG Tab Take 2 Tablets by mouth every day. 90 Tablet 3     No current Epic-ordered facility-administered medications on file.        Allergies:  Patient has no known allergies.    Health Maintenance: Completed    ROS:  Gen: no fevers/chills, patient has gained 5 pounds since January  Eyes: no changes in vision  ENT: no sore throat, no hearing loss, no bloody nose  Pulm: no sob, no cough  CV: no chest pain, no palpitations  GI: no nausea/vomiting, no diarrhea  : no dysuria  Neuro: no headaches, no numbness/tingling  Heme/Lymph: no easy bruising    Objective:     Exam:  /76 (BP Location: Right arm, Patient Position: Sitting, BP Cuff Size: Large adult)   Pulse (!) 102   Temp 35.8 °C (96.5 °F) (Temporal)   Resp 18   Ht 1.829 m (6')   Wt (!) 154 kg (339 lb 3.2 oz)   SpO2 95%   BMI 46.00 kg/m²  Body mass index is 46 kg/m².    Gen: Alert and oriented, No apparent distress.  Skin: Warm and dry.  No obvious lesions.  Eyes: Sclera wnl Pupils normal in size  Lungs: Normal effort, CTA bilaterally, no wheezes, rhonchi, or rales  CV: Regular rate and rhythm. No murmurs, rubs, or gallops.  ABD: Soft non-tender no organomegaly, patient may have a very slight left upper quadrant discomfort.  Musculoskeletal: Normal gait. No extremity cyanosis, clubbing, or edema.  Neuro: Oriented to person, place and time  Psych: Mood is wnl       Assessment & Plan:     64 y.o. male with the following -     1. Left upper quadrant abdominal pain  I discussed with patient getting a CT done patient does not want a CAT scan done patient was agreeable to get a abdominal ultrasound done instead.  Will also order some lab work and see him back in about 3 weeks for follow-up patient agreeable with the plan.  Apparently patient was admitted at [facility] in August looks like he had a CT of the abdomen done that showed either normal or question of mild infectious enteritis  - US-ABDOMEN COMPLETE SURVEY; Future  - CBC WITH DIFFERENTIAL; Future  - Comp Metabolic Panel; Future  - LIPASE; Future  - AMYLASE; Future    2. Type 2 diabetes mellitus with microalbuminuria,  without long-term current use of insulin (HCC)  Patient's hemoglobin A1c is 7.1 at this time patient wants to continue working on his weight and diet and does not want a referral to pharmacotherapy.  - POCT  A1C       Return in about 3 weeks (around 5/7/2024), or if symptoms worsen or fail to improve.    Please note that this dictation was created using voice recognition software. I have made every reasonable attempt to correct obvious errors, but I expect that there are errors of grammar and possibly content that I did not discover before finalizing the note.

## 2024-04-23 ENCOUNTER — HOSPITAL ENCOUNTER (OUTPATIENT)
Dept: LAB | Facility: MEDICAL CENTER | Age: 64
End: 2024-04-23
Attending: FAMILY MEDICINE
Payer: COMMERCIAL

## 2024-04-23 DIAGNOSIS — R10.12 LEFT UPPER QUADRANT ABDOMINAL PAIN: ICD-10-CM

## 2024-04-23 LAB
ALBUMIN SERPL BCP-MCNC: 4.3 G/DL (ref 3.2–4.9)
ALBUMIN/GLOB SERPL: 1.2 G/DL
ALP SERPL-CCNC: 86 U/L (ref 30–99)
ALT SERPL-CCNC: 11 U/L (ref 2–50)
AMYLASE SERPL-CCNC: 60 U/L (ref 20–103)
ANION GAP SERPL CALC-SCNC: 17 MMOL/L (ref 7–16)
AST SERPL-CCNC: 22 U/L (ref 12–45)
BASOPHILS # BLD AUTO: 0.5 % (ref 0–1.8)
BASOPHILS # BLD: 0.05 K/UL (ref 0–0.12)
BILIRUB SERPL-MCNC: 0.3 MG/DL (ref 0.1–1.5)
BUN SERPL-MCNC: 13 MG/DL (ref 8–22)
CALCIUM ALBUM COR SERPL-MCNC: 9.4 MG/DL (ref 8.5–10.5)
CALCIUM SERPL-MCNC: 9.6 MG/DL (ref 8.5–10.5)
CHLORIDE SERPL-SCNC: 102 MMOL/L (ref 96–112)
CO2 SERPL-SCNC: 22 MMOL/L (ref 20–33)
CREAT SERPL-MCNC: 0.88 MG/DL (ref 0.5–1.4)
EOSINOPHIL # BLD AUTO: 0.15 K/UL (ref 0–0.51)
EOSINOPHIL NFR BLD: 1.5 % (ref 0–6.9)
ERYTHROCYTE [DISTWIDTH] IN BLOOD BY AUTOMATED COUNT: 43.5 FL (ref 35.9–50)
GFR SERPLBLD CREATININE-BSD FMLA CKD-EPI: 97 ML/MIN/1.73 M 2
GLOBULIN SER CALC-MCNC: 3.6 G/DL (ref 1.9–3.5)
GLUCOSE SERPL-MCNC: 165 MG/DL (ref 65–99)
HCT VFR BLD AUTO: 43.7 % (ref 42–52)
HGB BLD-MCNC: 13.5 G/DL (ref 14–18)
IMM GRANULOCYTES # BLD AUTO: 0.03 K/UL (ref 0–0.11)
IMM GRANULOCYTES NFR BLD AUTO: 0.3 % (ref 0–0.9)
LIPASE SERPL-CCNC: 44 U/L (ref 11–82)
LYMPHOCYTES # BLD AUTO: 2.02 K/UL (ref 1–4.8)
LYMPHOCYTES NFR BLD: 20.5 % (ref 22–41)
MCH RBC QN AUTO: 25.7 PG (ref 27–33)
MCHC RBC AUTO-ENTMCNC: 30.9 G/DL (ref 32.3–36.5)
MCV RBC AUTO: 83.1 FL (ref 81.4–97.8)
MONOCYTES # BLD AUTO: 1.1 K/UL (ref 0–0.85)
MONOCYTES NFR BLD AUTO: 11.2 % (ref 0–13.4)
NEUTROPHILS # BLD AUTO: 6.5 K/UL (ref 1.82–7.42)
NEUTROPHILS NFR BLD: 66 % (ref 44–72)
NRBC # BLD AUTO: 0 K/UL
NRBC BLD-RTO: 0 /100 WBC (ref 0–0.2)
PLATELET # BLD AUTO: 282 K/UL (ref 164–446)
PMV BLD AUTO: 10.2 FL (ref 9–12.9)
POTASSIUM SERPL-SCNC: 3.7 MMOL/L (ref 3.6–5.5)
PROT SERPL-MCNC: 7.9 G/DL (ref 6–8.2)
RBC # BLD AUTO: 5.26 M/UL (ref 4.7–6.1)
SODIUM SERPL-SCNC: 141 MMOL/L (ref 135–145)
WBC # BLD AUTO: 9.9 K/UL (ref 4.8–10.8)

## 2024-04-23 PROCEDURE — 36415 COLL VENOUS BLD VENIPUNCTURE: CPT

## 2024-04-23 PROCEDURE — 85025 COMPLETE CBC W/AUTO DIFF WBC: CPT

## 2024-04-23 PROCEDURE — 83690 ASSAY OF LIPASE: CPT

## 2024-04-23 PROCEDURE — 80053 COMPREHEN METABOLIC PANEL: CPT

## 2024-04-23 PROCEDURE — 82150 ASSAY OF AMYLASE: CPT

## 2024-05-10 ENCOUNTER — HOSPITAL ENCOUNTER (OUTPATIENT)
Dept: RADIOLOGY | Facility: MEDICAL CENTER | Age: 64
End: 2024-05-10
Attending: FAMILY MEDICINE
Payer: COMMERCIAL

## 2024-05-10 DIAGNOSIS — R10.12 LEFT UPPER QUADRANT ABDOMINAL PAIN: ICD-10-CM

## 2024-05-14 ENCOUNTER — OFFICE VISIT (OUTPATIENT)
Dept: MEDICAL GROUP | Facility: PHYSICIAN GROUP | Age: 64
End: 2024-05-14
Payer: COMMERCIAL

## 2024-05-14 ENCOUNTER — HOSPITAL ENCOUNTER (OUTPATIENT)
Dept: LAB | Facility: MEDICAL CENTER | Age: 64
End: 2024-05-14
Attending: FAMILY MEDICINE
Payer: COMMERCIAL

## 2024-05-14 VITALS
BODY MASS INDEX: 42.66 KG/M2 | TEMPERATURE: 97.1 F | RESPIRATION RATE: 25 BRPM | HEART RATE: 104 BPM | SYSTOLIC BLOOD PRESSURE: 140 MMHG | OXYGEN SATURATION: 97 % | HEIGHT: 72 IN | WEIGHT: 315 LBS | DIASTOLIC BLOOD PRESSURE: 82 MMHG

## 2024-05-14 DIAGNOSIS — R77.1 ELEVATED SERUM GLOBULIN LEVEL: ICD-10-CM

## 2024-05-14 DIAGNOSIS — D50.9 IRON DEFICIENCY ANEMIA, UNSPECIFIED IRON DEFICIENCY ANEMIA TYPE: ICD-10-CM

## 2024-05-14 DIAGNOSIS — R93.2 ABNORMAL FINDING ON IMAGING OF LIVER: ICD-10-CM

## 2024-05-14 DIAGNOSIS — E11.29 TYPE 2 DIABETES MELLITUS WITH MICROALBUMINURIA, WITHOUT LONG-TERM CURRENT USE OF INSULIN (HCC): ICD-10-CM

## 2024-05-14 DIAGNOSIS — R80.9 TYPE 2 DIABETES MELLITUS WITH MICROALBUMINURIA, WITHOUT LONG-TERM CURRENT USE OF INSULIN (HCC): ICD-10-CM

## 2024-05-14 LAB
FERRITIN SERPL-MCNC: 15.2 NG/ML (ref 22–322)
HAV IGM SERPL QL IA: NORMAL
HBV CORE IGM SER QL: NORMAL
HBV SURFACE AG SER QL: NORMAL
HCV AB SER QL: NORMAL
IRON SATN MFR SERPL: 13 % (ref 15–55)
IRON SERPL-MCNC: 48 UG/DL (ref 50–180)
TIBC SERPL-MCNC: 382 UG/DL (ref 250–450)
UIBC SERPL-MCNC: 334 UG/DL (ref 110–370)

## 2024-05-14 PROCEDURE — 99214 OFFICE O/P EST MOD 30 MIN: CPT | Performed by: FAMILY MEDICINE

## 2024-05-14 PROCEDURE — 3079F DIAST BP 80-89 MM HG: CPT | Performed by: FAMILY MEDICINE

## 2024-05-14 PROCEDURE — 3077F SYST BP >= 140 MM HG: CPT | Performed by: FAMILY MEDICINE

## 2024-05-14 ASSESSMENT — FIBROSIS 4 INDEX: FIB4 SCORE: 1.46

## 2024-05-14 NOTE — PROGRESS NOTES
Subjective:     CC:   Chief Complaint   Patient presents with    Lab Follow-up       HPI:   Ramirez presents today with son for follow-up of his labs.  Patient states he just has this vague abdominal pain when he twists or turns.  I did asked patient if he still snoring he admits to it but he refuses to wear a mask for sleep apnea and at this time is not interested in getting retested.    Past Medical History:   Diagnosis Date    Diabetes     Hyperlipidemia     Hypertension        Social History     Tobacco Use    Smoking status: Former     Current packs/day: 0.00     Types: Cigarettes     Quit date: 1980     Years since quittin.4    Smokeless tobacco: Former     Types: Chew     Quit date: 1980    Tobacco comments:     NOT REALLY SURE WHAT YEAR HE QUIT SMOKING & CHEW   Vaping Use    Vaping Use: Never used   Substance Use Topics    Alcohol use: No    Drug use: Yes     Types: Marijuana       Current Outpatient Medications Ordered in Epic   Medication Sig Dispense Refill    atorvastatin (LIPITOR) 20 MG Tab Take 1 Tablet by mouth every day. 90 Tablet 3    chlorthalidone (HYGROTON) 25 MG Tab Take 1 Tablet by mouth every day. 90 Tablet 3    esomeprazole (NEXIUM) 40 MG delayed-release capsule Take 1 Capsule by mouth every morning before breakfast. 90 Capsule 3    fluoxetine (PROZAC) 40 MG capsule Take 1 Capsule by mouth every day. 90 Capsule 3    levothyroxine (SYNTHROID) 25 MCG Tab Take 1 Tablet by mouth every morning on an empty stomach. 90 Tablet 2    metformin (GLUCOPHAGE) 1000 MG tablet Take 1 Tablet by mouth 2 times a day with meals. 180 Tablet 3    lisinopril (PRINIVIL) 10 MG Tab Take 2 Tablets by mouth every day. 90 Tablet 3     No current Epic-ordered facility-administered medications on file.       Allergies:  Patient has no known allergies.    Health Maintenance: Completed    ROS:  Gen: no fevers/chills, no changes in weight  Eyes: no changes in vision  ENT: no sore throat, no hearing loss, no  bloody nose  Pulm: no sob, no cough  CV: no chest pain, no palpitations  GI: no nausea/vomiting, no diarrhea  : no dysuria  Neuro: no headaches, no numbness/tingling  Heme/Lymph: no easy bruising    Objective:     Exam:  BP (!) 140/82   Pulse (!) 104   Temp 36.2 °C (97.1 °F) (Temporal)   Resp (!) 25   Ht 1.829 m (6')   Wt (!) 152 kg (335 lb 8 oz)   SpO2 97%   BMI 45.50 kg/m²  Body mass index is 45.5 kg/m².    Gen: Alert and oriented, No apparent distress.  Skin: Warm and dry.  No obvious lesions.  Eyes: Sclera wnl Pupils normal in size  Lungs: Normal effort, CTA bilaterally, no wheezes, rhonchi, or rales  CV: Regular rate and rhythm. No murmurs, rubs, or gallops.  Musculoskeletal: Normal gait. No extremity cyanosis, clubbing, or edema.  Neuro: Oriented to person, place and time  Psych: Mood is wnl     Assessment & Plan:     64 y.o. male with the following -     1. Type 2 diabetes mellitus with microalbuminuria, without long-term current use of insulin (HCC)  I did asked patient about referral to dietitian I had written 1 at this time he is not interested.  I would recommend a referral to pharmacotherapy patient is agreeable with this.  His son in the room is a paramedic.- Referral to Pharmacotherapy Service    2. Abnormal finding on imaging of liver  Patient ultrasound shows a possibility of a fatty liver or hepatocellular disease.  Also spleen is mildly enlarged.  Recommend following up with this may need to consider referral to GI.  - HEPATITIS PANEL ACUTE(4 COMPONENTS); Future    3. Elevated serum globulin level  Since globulin slightly elevated we will go ahead and do a protein electrophoresis  - SERUM PROTEIN ELECTROPHORESIS; Future    4. Iron deficiency anemia, unspecified iron deficiency anemia type  Hemoglobin slightly low we will check iron and ferritin  - FERRITIN; Future  - IRON/TOTAL IRON BIND; Future       Return in about 4 weeks (around 6/11/2024), or if symptoms worsen or fail to  improve.    Please note that this dictation was created using voice recognition software. I have made every reasonable attempt to correct obvious errors, but I expect that there are errors of grammar and possibly content that I did not discover before finalizing the note.

## 2024-05-15 ENCOUNTER — TELEPHONE (OUTPATIENT)
Dept: HEALTH INFORMATION MANAGEMENT | Facility: OTHER | Age: 64
End: 2024-05-15
Payer: COMMERCIAL

## 2024-05-17 LAB
ALBUMIN SERPL ELPH-MCNC: 3.88 G/DL (ref 3.75–5.01)
ALPHA1 GLOB SERPL ELPH-MCNC: 0.3 G/DL (ref 0.19–0.46)
ALPHA2 GLOB SERPL ELPH-MCNC: 0.8 G/DL (ref 0.48–1.05)
B-GLOBULIN SERPL ELPH-MCNC: 1.28 G/DL (ref 0.48–1.1)
EER PROT ELECT SER Q1092: ABNORMAL
GAMMA GLOB SERPL ELPH-MCNC: 1.35 G/DL (ref 0.62–1.51)
INTERPRETATION SERPL IFE-IMP: ABNORMAL
MONOCLONAL PROTEIN NL11656: ABNORMAL G/DL
PROT SERPL-MCNC: 7.6 G/DL (ref 6.3–8.2)

## 2024-05-19 DIAGNOSIS — R79.9 ABNORMAL BLOOD CHEMISTRY: ICD-10-CM

## 2024-05-19 DIAGNOSIS — D50.9 IRON DEFICIENCY ANEMIA, UNSPECIFIED IRON DEFICIENCY ANEMIA TYPE: ICD-10-CM

## 2024-06-19 ENCOUNTER — HOSPITAL ENCOUNTER (OUTPATIENT)
Dept: LAB | Facility: MEDICAL CENTER | Age: 64
End: 2024-06-19
Attending: FAMILY MEDICINE
Payer: COMMERCIAL

## 2024-06-19 DIAGNOSIS — R79.9 ABNORMAL BLOOD CHEMISTRY: ICD-10-CM

## 2024-06-19 DIAGNOSIS — D50.9 IRON DEFICIENCY ANEMIA, UNSPECIFIED IRON DEFICIENCY ANEMIA TYPE: ICD-10-CM

## 2024-06-19 LAB
ALBUMIN SERPL BCP-MCNC: 3.9 G/DL (ref 3.2–4.9)
ALBUMIN/GLOB SERPL: 1.1 G/DL
ALP SERPL-CCNC: 81 U/L (ref 30–99)
ALT SERPL-CCNC: 15 U/L (ref 2–50)
ANION GAP SERPL CALC-SCNC: 11 MMOL/L (ref 7–16)
AST SERPL-CCNC: 16 U/L (ref 12–45)
BASOPHILS # BLD AUTO: 1 % (ref 0–1.8)
BASOPHILS # BLD: 0.08 K/UL (ref 0–0.12)
BILIRUB SERPL-MCNC: 0.3 MG/DL (ref 0.1–1.5)
BUN SERPL-MCNC: 13 MG/DL (ref 8–22)
CALCIUM ALBUM COR SERPL-MCNC: 9.3 MG/DL (ref 8.5–10.5)
CALCIUM SERPL-MCNC: 9.2 MG/DL (ref 8.5–10.5)
CHLORIDE SERPL-SCNC: 102 MMOL/L (ref 96–112)
CO2 SERPL-SCNC: 26 MMOL/L (ref 20–33)
CREAT SERPL-MCNC: 0.79 MG/DL (ref 0.5–1.4)
EOSINOPHIL # BLD AUTO: 0.16 K/UL (ref 0–0.51)
EOSINOPHIL NFR BLD: 1.9 % (ref 0–6.9)
ERYTHROCYTE [DISTWIDTH] IN BLOOD BY AUTOMATED COUNT: 49.1 FL (ref 35.9–50)
FERRITIN SERPL-MCNC: 35.1 NG/ML (ref 22–322)
GFR SERPLBLD CREATININE-BSD FMLA CKD-EPI: 100 ML/MIN/1.73 M 2
GLOBULIN SER CALC-MCNC: 3.5 G/DL (ref 1.9–3.5)
GLUCOSE SERPL-MCNC: 171 MG/DL (ref 65–99)
HCT VFR BLD AUTO: 43.9 % (ref 42–52)
HGB BLD-MCNC: 14.1 G/DL (ref 14–18)
IMM GRANULOCYTES # BLD AUTO: 0.03 K/UL (ref 0–0.11)
IMM GRANULOCYTES NFR BLD AUTO: 0.4 % (ref 0–0.9)
IRON SATN MFR SERPL: 22 % (ref 15–55)
IRON SERPL-MCNC: 74 UG/DL (ref 50–180)
LYMPHOCYTES # BLD AUTO: 1.73 K/UL (ref 1–4.8)
LYMPHOCYTES NFR BLD: 21 % (ref 22–41)
MCH RBC QN AUTO: 26.8 PG (ref 27–33)
MCHC RBC AUTO-ENTMCNC: 32.1 G/DL (ref 32.3–36.5)
MCV RBC AUTO: 83.3 FL (ref 81.4–97.8)
MONOCYTES # BLD AUTO: 0.96 K/UL (ref 0–0.85)
MONOCYTES NFR BLD AUTO: 11.7 % (ref 0–13.4)
NEUTROPHILS # BLD AUTO: 5.26 K/UL (ref 1.82–7.42)
NEUTROPHILS NFR BLD: 64 % (ref 44–72)
NRBC # BLD AUTO: 0 K/UL
NRBC BLD-RTO: 0 /100 WBC (ref 0–0.2)
PLATELET # BLD AUTO: 236 K/UL (ref 164–446)
PMV BLD AUTO: 9.8 FL (ref 9–12.9)
POTASSIUM SERPL-SCNC: 3.7 MMOL/L (ref 3.6–5.5)
PROT SERPL-MCNC: 7.4 G/DL (ref 6–8.2)
RBC # BLD AUTO: 5.27 M/UL (ref 4.7–6.1)
SODIUM SERPL-SCNC: 139 MMOL/L (ref 135–145)
TIBC SERPL-MCNC: 330 UG/DL (ref 250–450)
UIBC SERPL-MCNC: 256 UG/DL (ref 110–370)
WBC # BLD AUTO: 8.2 K/UL (ref 4.8–10.8)

## 2024-06-19 PROCEDURE — 36415 COLL VENOUS BLD VENIPUNCTURE: CPT

## 2024-06-19 PROCEDURE — 85025 COMPLETE CBC W/AUTO DIFF WBC: CPT

## 2024-06-19 PROCEDURE — 83550 IRON BINDING TEST: CPT

## 2024-06-19 PROCEDURE — 82728 ASSAY OF FERRITIN: CPT

## 2024-06-19 PROCEDURE — 83540 ASSAY OF IRON: CPT

## 2024-06-19 PROCEDURE — 80053 COMPREHEN METABOLIC PANEL: CPT

## 2024-06-26 ENCOUNTER — OFFICE VISIT (OUTPATIENT)
Dept: MEDICAL GROUP | Facility: PHYSICIAN GROUP | Age: 64
End: 2024-06-26
Payer: COMMERCIAL

## 2024-06-26 VITALS
RESPIRATION RATE: 18 BRPM | TEMPERATURE: 96.9 F | HEIGHT: 72 IN | BODY MASS INDEX: 42.66 KG/M2 | OXYGEN SATURATION: 96 % | SYSTOLIC BLOOD PRESSURE: 138 MMHG | DIASTOLIC BLOOD PRESSURE: 76 MMHG | WEIGHT: 315 LBS | HEART RATE: 105 BPM

## 2024-06-26 DIAGNOSIS — I10 ESSENTIAL HYPERTENSION, BENIGN: ICD-10-CM

## 2024-06-26 DIAGNOSIS — R80.9 TYPE 2 DIABETES MELLITUS WITH MICROALBUMINURIA, WITHOUT LONG-TERM CURRENT USE OF INSULIN (HCC): ICD-10-CM

## 2024-06-26 DIAGNOSIS — D64.9 ANEMIA, UNSPECIFIED TYPE: ICD-10-CM

## 2024-06-26 DIAGNOSIS — E11.29 TYPE 2 DIABETES MELLITUS WITH MICROALBUMINURIA, WITHOUT LONG-TERM CURRENT USE OF INSULIN (HCC): ICD-10-CM

## 2024-06-26 DIAGNOSIS — R63.5 WEIGHT GAIN: ICD-10-CM

## 2024-06-26 PROCEDURE — 99213 OFFICE O/P EST LOW 20 MIN: CPT | Performed by: FAMILY MEDICINE

## 2024-06-26 PROCEDURE — 3075F SYST BP GE 130 - 139MM HG: CPT | Performed by: FAMILY MEDICINE

## 2024-06-26 PROCEDURE — 3078F DIAST BP <80 MM HG: CPT | Performed by: FAMILY MEDICINE

## 2024-06-26 ASSESSMENT — FIBROSIS 4 INDEX: FIB4 SCORE: 1.09

## 2024-06-26 NOTE — PROGRESS NOTES
Subjective:     CC:   Chief Complaint   Patient presents with    Follow-Up       HPI:   Ramirez presents today for follow-up of his labs.  Patient feels he is doing well patient does have appointment for with pharmacotherapy .    Past Medical History:   Diagnosis Date    Diabetes     Hyperlipidemia     Hypertension        Social History     Tobacco Use    Smoking status: Former     Current packs/day: 0.00     Types: Cigarettes     Quit date: 1980     Years since quittin.5    Smokeless tobacco: Former     Types: Chew     Quit date: 1980    Tobacco comments:     NOT REALLY SURE WHAT YEAR HE QUIT SMOKING & CHEW   Vaping Use    Vaping status: Never Used   Substance Use Topics    Alcohol use: No    Drug use: Yes     Types: Marijuana       Current Outpatient Medications Ordered in Epic   Medication Sig Dispense Refill    atorvastatin (LIPITOR) 20 MG Tab Take 1 Tablet by mouth every day. 90 Tablet 3    chlorthalidone (HYGROTON) 25 MG Tab Take 1 Tablet by mouth every day. 90 Tablet 3    esomeprazole (NEXIUM) 40 MG delayed-release capsule Take 1 Capsule by mouth every morning before breakfast. 90 Capsule 3    fluoxetine (PROZAC) 40 MG capsule Take 1 Capsule by mouth every day. 90 Capsule 3    levothyroxine (SYNTHROID) 25 MCG Tab Take 1 Tablet by mouth every morning on an empty stomach. 90 Tablet 2    metformin (GLUCOPHAGE) 1000 MG tablet Take 1 Tablet by mouth 2 times a day with meals. 180 Tablet 3    lisinopril (PRINIVIL) 10 MG Tab Take 2 Tablets by mouth every day. 90 Tablet 3     No current Epic-ordered facility-administered medications on file.       Allergies:  Patient has no known allergies.    Health Maintenance: Completed    ROS:  Gen: no fevers/chills, has gained 7 pounds in over a month  Eyes: no changes in vision  ENT: no sore throat, no hearing loss, no bloody nose  Pulm: no sob, no cough  CV: no chest pain, no palpitations  GI: no nausea/vomiting, no diarrhea  : no dysuria  Neuro: no  headaches, no numbness/tingling  Heme/Lymph: no easy bruising    Objective:     Exam:  /76 (BP Location: Left arm, Patient Position: Sitting, BP Cuff Size: Large adult)   Pulse (!) 105   Temp 36.1 °C (96.9 °F) (Temporal)   Resp 18   Ht 1.829 m (6')   Wt (!) 155 kg (342 lb 3.2 oz)   SpO2 96%   BMI 46.41 kg/m²  Body mass index is 46.41 kg/m².    Gen: Alert and oriented, No apparent distress.  Skin: Warm and dry.  No obvious lesions.  Patient able to feel equally in both feet on monofilament exam  Eyes: Sclera wnl Pupils normal in size  Lungs: Normal effort, CTA bilaterally, no wheezes, rhonchi, or rales  CV: Regular rate and rhythm. No murmurs, rubs, or gallops.  Musculoskeletal: Normal gait. No extremity cyanosis, clubbing, or edema.  Neuro: Oriented to person, place and time  Psych: Mood is wnl       Assessment & Plan:     64 y.o. male with the following -     1. Anemia, unspecified type  Patient's hemoglobin along with ferritin and iron look a lot better patient states he is taking low-dose iron.    2. Weight gain  Patient is still gaining weight patient encouraged to increase his activity.    3. Type 2 diabetes mellitus with microalbuminuria, without long-term current use of insulin ([facility])  Does have appointment with pharmacotherapy coming up I should see him back sometime in August    4. Essential hypertension, benign  Patient's blood pressure looks at goal continue present treatment plan.       Return in about 2 months (around 8/26/2024), or if symptoms worsen or fail to improve.    Please note that this dictation was created using voice recognition software. I have made every reasonable attempt to correct obvious errors, but I expect that there are errors of grammar and possibly content that I did not discover before finalizing the note.

## 2024-07-19 ENCOUNTER — APPOINTMENT (OUTPATIENT)
Dept: MEDICAL GROUP | Facility: PHYSICIAN GROUP | Age: 64
End: 2024-07-19
Payer: COMMERCIAL

## 2024-07-19 VITALS — BODY MASS INDEX: 46.4 KG/M2 | HEART RATE: 107 BPM | RESPIRATION RATE: 14 BRPM | OXYGEN SATURATION: 96 % | HEIGHT: 72 IN

## 2024-07-19 DIAGNOSIS — E11.9 TYPE 2 DIABETES MELLITUS WITHOUT COMPLICATION, WITHOUT LONG-TERM CURRENT USE OF INSULIN (HCC): ICD-10-CM

## 2024-07-19 PROCEDURE — 99403 PREV MED CNSL INDIV APPRX 45: CPT | Performed by: PHARMACIST

## 2024-07-19 RX ORDER — TIRZEPATIDE 2.5 MG/.5ML
1 INJECTION, SOLUTION SUBCUTANEOUS
Qty: 2 ML | Refills: 0 | Status: SHIPPED | OUTPATIENT
Start: 2024-07-19

## 2024-07-30 ENCOUNTER — DOCUMENTATION (OUTPATIENT)
Dept: MEDICAL GROUP | Facility: PHYSICIAN GROUP | Age: 64
End: 2024-07-30
Payer: COMMERCIAL

## 2024-08-23 ENCOUNTER — OFFICE VISIT (OUTPATIENT)
Dept: MEDICAL GROUP | Facility: PHYSICIAN GROUP | Age: 64
End: 2024-08-23
Payer: COMMERCIAL

## 2024-08-23 DIAGNOSIS — E11.9 TYPE 2 DIABETES MELLITUS WITHOUT COMPLICATION, WITHOUT LONG-TERM CURRENT USE OF INSULIN (HCC): ICD-10-CM

## 2024-08-23 PROCEDURE — 99401 PREV MED CNSL INDIV APPRX 15: CPT | Performed by: PHARMACIST

## 2024-08-23 RX ORDER — ATORVASTATIN CALCIUM 40 MG/1
40 TABLET, FILM COATED ORAL NIGHTLY
Qty: 90 TABLET | Refills: 3 | Status: SHIPPED | OUTPATIENT
Start: 2024-08-23

## 2024-08-23 RX ORDER — BLOOD SUGAR DIAGNOSTIC
STRIP MISCELLANEOUS
Qty: 100 STRIP | Refills: 4 | Status: SHIPPED | OUTPATIENT
Start: 2024-08-23

## 2024-08-23 RX ORDER — BLOOD-GLUCOSE METER
EACH MISCELLANEOUS
Qty: 1 KIT | Refills: 0 | Status: SHIPPED | OUTPATIENT
Start: 2024-08-23

## 2024-08-23 RX ORDER — LANCETS
EACH MISCELLANEOUS
Qty: 100 EACH | Refills: 4 | Status: SHIPPED | OUTPATIENT
Start: 2024-08-23

## 2024-08-23 RX ORDER — TIRZEPATIDE 5 MG/.5ML
1 INJECTION, SOLUTION SUBCUTANEOUS
Qty: 2 ML | Refills: 0 | Status: SHIPPED | OUTPATIENT
Start: 2024-08-23

## 2024-08-23 NOTE — PROGRESS NOTES
Patient Consult Note - Follow Up Visit  Primary care physician: Rosalie Colby M.D.    Reason for consult: Management of Uncontrolled Type 2 Diabetes    Time IN:  2:54pm  Time OUT:  3:11pm    HPI:  Ramirez Harmon is a 64 y.o. old patient who comes in today for evaluation of above stated problem.    Most Recent HbA1c:   Lab Results   Component Value Date/Time    HBA1C 7.1 (A) 04/16/2024 10:45 AM      Reliable and Exact Care Diabetes Score    Displays the Diabetes REC Score.  A patient gets 1 point for each measure for a maximum of 7 points   1  Measures Not Met      Factor Value   REC DM SCORE - LDL has been performed in the last year and is below 100 Not Met   - LDL calculated 105 (1/11/2024)               Diabetes Medication History and Current Regimen  Mounjaro 2.5 SQ once weekly  Metformin: 1000 mg BID      Previously attempted medications  None    Discussed FBG goal of , 2hrPP <180, and A1c <7.0%.  Before Breakfast:  Before Lunch:  Before Dinner:  Before Bedtime:  Other times:  Hypoglycemia:  None    Diet/Exercise:  Patient endorses decrease in overall appetite for first couple weeks of Mounjaro dosing, but that has waned a bit.  No weight loss at this point, will continue to monitor.  No changes to types of foods that make up typical nutrition habits, just eating less overall.  No committed exercise habits at this time.    Previous visit:  Current Exercise - No exercise currently   Exercise Goal - Encouraged pt to start walking his dog more often to get in 150 minutes of exercise     Dietary -      Pt reports eating:  Breakfast - Breakfast sandwich (frozen) and coffee with doughnut   Lunch - Pizza, or burgers for lunch and dinner  Snack - Doughnuts and other simple sugars  Zero sugar Gatorades and water, occasionally some juice     Moderate portion size    Preventative Management  BP regimen (ACEi/ARB): Lisinopril 20 mg QD  BP <140/90: Yes  Statin: atorvastatin (Lipitor) 20 mg daily  LDL <100: No  Lab  Results   Component Value Date/Time    CHOLSTRLTOT 177 01/11/2024 07:53 AM     (H) 01/11/2024 07:53 AM    HDL 47 01/11/2024 07:53 AM    TRIGLYCERIDE 125 01/11/2024 07:53 AM       Last Microalbumin/Cr:  Lab Results   Component Value Date/Time    MALBCRT 105 (H) 01/11/2024 07:53 AM    MICROALBUR 21.8 01/11/2024 07:53 AM     Last A1c:  Lab Results   Component Value Date/Time    HBA1C 7.1 (A) 04/16/2024 10:45 AM      Last Retinal Scan: up to date, last 11/2023    Monofilament exam: up to date, last 6/2024       Updated caregaps      ROS:  Constitutional: No weight loss  Cardiac: No palpitations or racing heart  Resp: No shortness of breath  Neuro: No numbness or tinging in feet  Endo: No heat or cold intolerance, no polyuria or polydipsia  All other systems were reviewed and were negative.    Past Medical History:  Patient Active Problem List    Diagnosis Date Noted    Weight gain 06/26/2024    Anemia 06/26/2024    Other specified hypothyroidism 09/13/2023    Vitamin D deficiency 09/13/2023    Morbid obesity with BMI of 40.0-44.9, adult ([facility]) 02/28/2022    GERD (gastroesophageal reflux disease) 07/31/2018    Anxiety 07/31/2018    Type 2 diabetes mellitus with microalbuminuria, without long-term current use of insulin ([facility]) 12/04/2012    Dyslipidemia 12/04/2012    Essential hypertension, benign 12/04/2012    Memory dysfunction 12/04/2012    Sleep apnea 12/04/2012    Hx of Botulism 01/01/2007       Past Surgical History:  Past Surgical History:   Procedure Laterality Date    TRACHEOSTOMY         Allergies:  Patient has no known allergies.    Social History:  Social History     Socioeconomic History    Marital status:      Spouse name: Not on file    Number of children: Not on file    Years of education: Not on file    Highest education level: Not on file   Occupational History    Not on file   Tobacco Use    Smoking status: Former     Current packs/day: 0.00     Types: Cigarettes     Quit date: 12/4/1980      Years since quittin.7    Smokeless tobacco: Former     Types: Chew     Quit date: 1980    Tobacco comments:     NOT REALLY SURE WHAT YEAR HE QUIT SMOKING & CHEW   Vaping Use    Vaping status: Never Used   Substance and Sexual Activity    Alcohol use: No    Drug use: Yes     Types: Marijuana    Sexual activity: Not on file   Other Topics Concern    Not on file   Social History Narrative    Not on file     Social Determinants of Health     Financial Resource Strain: Not on file   Food Insecurity: Not on file   Transportation Needs: Not on file   Physical Activity: Not on file   Stress: Not on file   Social Connections: Not on file   Intimate Partner Violence: Not on file   Housing Stability: Not on file       Family History:  Family History   Problem Relation Age of Onset    Stroke Mother     Heart Disease Father     Heart Disease Maternal Grandmother     Heart Disease Maternal Grandfather     Heart Disease Paternal Grandmother     Heart Disease Paternal Grandfather     Cancer Neg Hx        Medications:    Current Outpatient Medications:     Tirzepatide (MOUNJARO) 2.5 MG/0.5ML Solution Pen-injector, Inject 1 Pen under the skin every 7 days., Disp: 2 mL, Rfl: 0    atorvastatin (LIPITOR) 20 MG Tab, Take 1 Tablet by mouth every day., Disp: 90 Tablet, Rfl: 3    chlorthalidone (HYGROTON) 25 MG Tab, Take 1 Tablet by mouth every day., Disp: 90 Tablet, Rfl: 3    esomeprazole (NEXIUM) 40 MG delayed-release capsule, Take 1 Capsule by mouth every morning before breakfast., Disp: 90 Capsule, Rfl: 3    fluoxetine (PROZAC) 40 MG capsule, Take 1 Capsule by mouth every day., Disp: 90 Capsule, Rfl: 3    levothyroxine (SYNTHROID) 25 MCG Tab, Take 1 Tablet by mouth every morning on an empty stomach., Disp: 90 Tablet, Rfl: 2    metformin (GLUCOPHAGE) 1000 MG tablet, Take 1 Tablet by mouth 2 times a day with meals., Disp: 180 Tablet, Rfl: 3    lisinopril (PRINIVIL) 10 MG Tab, Take 2 Tablets by mouth every day., Disp: 90 Tablet,  Rfl: 3    Labs: Reviewed    Physical Examination:  Vital signs: There were no vitals taken for this visit. There is no height or weight on file to calculate BMI.  General: No apparent distress, cooperative  Eyes: No scleral icterus or discharge  ENMT: Normal on external inspection of nose, lips, normal thyroid exam  Neck: No abnormal masses on inspection  Resp: Normal effort, clear to auscultation bilaterally   CVS: Regular rate and rhythm, S1 S2 normal, no murmur   Extremities: No edema  Abdomen: abdominal obesity present  Neuro: Alert and oriented  Skin: No rash  Psych: Normal mood and affect, intact memory and able to make informed decisions    Assessment and Plan:    Basic physiology of DMII was explained to patient as well as microvascular/macrovascular complications. The importance of increasing physical activity to improve diabetes control was discussed with the patient. Patient was also educated on changing diet and making better choices to help control blood sugar.    Patient is optimized on metformin.  Tolerating current dosing of Mounjaro.  No FBG to report, old monitor and strips.  Will send update testing supplies to local pharmacy.    As above, no appreciable changes to nutrition habits, other than eating less overall.  Decrease in overall appetite for couple weeks when starting Mounjaro, that has waned some.  No routine exercise in place, patient noncommittal to beginning any sort of routine.    INCREASE Mounjaro to 5mg SQ once weekly, then to 7.5mg thereafter (will reach out via [facility] in three weeks to determine tolerability).    INCREASE atorvastatin to 40mg QD for optimal LDL-C control.  Continue all other medications for now.  Stressed importance of modifying current nutrition habits in light of newly started GIPa therapy.  Eliminate fast foods and simple carbs, focus more on lean proteins, high fiber, and eating at home.  Incorporate some sort of daily exercise.    Follow Up:  Eight weeks  (three weeks via garbs)    Thank you for allowing me to participate in the care of this patient.    Cain Martinez, PharmD, BCACP  08/23/24    CC:   Rosalie Colby M.D.

## 2024-08-28 ENCOUNTER — OFFICE VISIT (OUTPATIENT)
Dept: MEDICAL GROUP | Facility: PHYSICIAN GROUP | Age: 64
End: 2024-08-28
Payer: COMMERCIAL

## 2024-08-28 VITALS
DIASTOLIC BLOOD PRESSURE: 74 MMHG | RESPIRATION RATE: 18 BRPM | OXYGEN SATURATION: 95 % | SYSTOLIC BLOOD PRESSURE: 128 MMHG | TEMPERATURE: 98.5 F | WEIGHT: 315 LBS | HEART RATE: 98 BPM | HEIGHT: 72 IN | BODY MASS INDEX: 42.66 KG/M2

## 2024-08-28 DIAGNOSIS — E55.9 VITAMIN D DEFICIENCY: ICD-10-CM

## 2024-08-28 DIAGNOSIS — R63.5 WEIGHT GAIN: ICD-10-CM

## 2024-08-28 DIAGNOSIS — I10 ESSENTIAL HYPERTENSION, BENIGN: ICD-10-CM

## 2024-08-28 DIAGNOSIS — E03.8 OTHER SPECIFIED HYPOTHYROIDISM: ICD-10-CM

## 2024-08-28 DIAGNOSIS — D64.9 ANEMIA, UNSPECIFIED TYPE: ICD-10-CM

## 2024-08-28 DIAGNOSIS — E11.29 TYPE 2 DIABETES MELLITUS WITH MICROALBUMINURIA, WITHOUT LONG-TERM CURRENT USE OF INSULIN (HCC): ICD-10-CM

## 2024-08-28 DIAGNOSIS — R80.9 TYPE 2 DIABETES MELLITUS WITH MICROALBUMINURIA, WITHOUT LONG-TERM CURRENT USE OF INSULIN (HCC): ICD-10-CM

## 2024-08-28 PROCEDURE — 3078F DIAST BP <80 MM HG: CPT | Performed by: FAMILY MEDICINE

## 2024-08-28 PROCEDURE — 99214 OFFICE O/P EST MOD 30 MIN: CPT | Performed by: FAMILY MEDICINE

## 2024-08-28 PROCEDURE — 3074F SYST BP LT 130 MM HG: CPT | Performed by: FAMILY MEDICINE

## 2024-08-28 ASSESSMENT — FIBROSIS 4 INDEX: FIB4 SCORE: 1.09

## 2024-08-28 NOTE — PROGRESS NOTES
Subjective:     CC:   Chief Complaint   Patient presents with    Follow-Up       HPI:   Ramirez presents today for follow-up.  Unfortunately our system was down but I did notice that he did see Cain patient states he supposed to follow-up with him in 3 months.  Patient states that Mounjaro was increased last time he saw him.  Patient feels he is doing well and has no complaints    Past Medical History:   Diagnosis Date    Diabetes     Hyperlipidemia     Hypertension        Social History     Tobacco Use    Smoking status: Former     Current packs/day: 0.00     Types: Cigarettes     Quit date: 1980     Years since quittin.7    Smokeless tobacco: Former     Types: Chew     Quit date: 1980    Tobacco comments:     NOT REALLY SURE WHAT YEAR HE QUIT SMOKING & CHEW   Vaping Use    Vaping status: Never Used   Substance Use Topics    Alcohol use: No    Drug use: Yes     Types: Marijuana       Current Outpatient Medications Ordered in Epic   Medication Sig Dispense Refill    Tirzepatide (MOUNJARO) 5 MG/0.5ML Solution Pen-injector Inject 1 Pen under the skin every 7 days. 2 mL 0    atorvastatin (LIPITOR) 40 MG Tab Take 1 Tablet by mouth every evening. 90 Tablet 3    Blood Glucose Monitoring Suppl (ONE TOUCH ULTRA 2) w/Device Kit Use to test blood glucose one to two times daily 1 Kit 0    glucose blood (ONETOUCH ULTRA) strip Use to test blood glucose one to two times daily 100 Strip 4    ONE TOUCH ULTRASOFT LANCETS Misc Use to test blood glucose one to two times daily 100 Each 4    chlorthalidone (HYGROTON) 25 MG Tab Take 1 Tablet by mouth every day. 90 Tablet 3    esomeprazole (NEXIUM) 40 MG delayed-release capsule Take 1 Capsule by mouth every morning before breakfast. 90 Capsule 3    fluoxetine (PROZAC) 40 MG capsule Take 1 Capsule by mouth every day. 90 Capsule 3    levothyroxine (SYNTHROID) 25 MCG Tab Take 1 Tablet by mouth every morning on an empty stomach. 90 Tablet 2    metformin (GLUCOPHAGE) 1000 MG  tablet Take 1 Tablet by mouth 2 times a day with meals. 180 Tablet 3    lisinopril (PRINIVIL) 10 MG Tab Take 2 Tablets by mouth every day. 90 Tablet 3     No current Epic-ordered facility-administered medications on file.       Allergies:  Patient has no known allergies.    Health Maintenance: Completed    ROS:  Gen: no fevers/chills, patient has lost 6 pounds in 2 months  Eyes: no changes in vision  ENT: no sore throat, no hearing loss, no bloody nose  Pulm: no sob, no cough  CV: no chest pain, no palpitations  GI: no nausea/vomiting, no diarrhea  : no dysuria  Neuro: no headaches, no numbness/tingling  Heme/Lymph: no easy bruising    Objective:     Exam:  /74 (BP Location: Left arm, Patient Position: Sitting, BP Cuff Size: Large adult)   Pulse 98   Temp 36.9 °C (98.5 °F) (Temporal)   Resp 18   Ht 1.829 m (6')   Wt (!) 153 kg (336 lb 8 oz)   SpO2 95%   BMI 45.64 kg/m²  Body mass index is 45.64 kg/m².    Gen: Alert and oriented, No apparent distress.  Skin: Warm and dry.  No obvious lesions.  Eyes: Sclera wnl Pupils normal in size  Lungs: Normal effort, CTA bilaterally, no wheezes, rhonchi, or rales  CV: Regular rate and rhythm. No murmurs, rubs, or gallops.  Musculoskeletal: Normal gait. No extremity cyanosis, clubbing, or edema.  Neuro: Oriented to person, place and time  Psych: Mood is wnl       Assessment & Plan:     64 y.o. male with the following -     1. Type 2 diabetes mellitus with microalbuminuria, without long-term current use of insulin (HCC)  When epic came back online looks like patient does have appointment with pharmacotherapy in October.  I would like to see him back in 3 months or sooner if he has more problems.    2. Essential hypertension, benign  Patient looks at goal continue present medication.  Looks like someone else had ordered his prescription for lisinopril he should theoretically should be out will have my MA contact him to verify this.  3. Weight gain  Patient has lost  weight we will continue to fall him on his weight    4. Anemia, unspecified type  Last CBC and ferritin level looks at good range we will need to repeat this again in about 3 months       Return in about 3 months (around 11/28/2024), or if symptoms worsen or fail to improve.    Please note that this dictation was created using voice recognition software. I have made every reasonable attempt to correct obvious errors, but I expect that there are errors of grammar and possibly content that I did not discover before finalizing the note.

## 2024-09-16 RX ORDER — TIRZEPATIDE 7.5 MG/.5ML
1 INJECTION, SOLUTION SUBCUTANEOUS
Qty: 2 ML | Refills: 1 | Status: SHIPPED | OUTPATIENT
Start: 2024-09-16

## 2024-10-18 ENCOUNTER — OFFICE VISIT (OUTPATIENT)
Dept: MEDICAL GROUP | Facility: PHYSICIAN GROUP | Age: 64
End: 2024-10-18
Payer: COMMERCIAL

## 2024-10-18 VITALS
HEIGHT: 72 IN | BODY MASS INDEX: 42.66 KG/M2 | HEART RATE: 76 BPM | RESPIRATION RATE: 13 BRPM | OXYGEN SATURATION: 98 % | WEIGHT: 315 LBS

## 2024-10-18 DIAGNOSIS — E11.29 TYPE 2 DIABETES MELLITUS WITH MICROALBUMINURIA, WITHOUT LONG-TERM CURRENT USE OF INSULIN (HCC): ICD-10-CM

## 2024-10-18 DIAGNOSIS — R80.9 TYPE 2 DIABETES MELLITUS WITH MICROALBUMINURIA, WITHOUT LONG-TERM CURRENT USE OF INSULIN (HCC): ICD-10-CM

## 2024-10-18 DIAGNOSIS — E78.5 DYSLIPIDEMIA: ICD-10-CM

## 2024-10-18 LAB
HBA1C MFR BLD: 5.6 % (ref ?–5.8)
POCT INT CON NEG: NEGATIVE
POCT INT CON POS: POSITIVE

## 2024-10-18 PROCEDURE — 99401 PREV MED CNSL INDIV APPRX 15: CPT | Performed by: PHARMACIST

## 2024-10-18 PROCEDURE — 83036 HEMOGLOBIN GLYCOSYLATED A1C: CPT | Performed by: PHARMACIST

## 2024-10-18 ASSESSMENT — FIBROSIS 4 INDEX: FIB4 SCORE: 1.09

## 2024-11-08 DIAGNOSIS — K21.9 GASTROESOPHAGEAL REFLUX DISEASE WITHOUT ESOPHAGITIS: ICD-10-CM

## 2024-11-08 RX ORDER — ESOMEPRAZOLE MAGNESIUM 40 MG/1
40 CAPSULE, DELAYED RELEASE ORAL
Qty: 90 CAPSULE | Refills: 0 | Status: SHIPPED | OUTPATIENT
Start: 2024-11-08 | End: 2024-11-26 | Stop reason: SDUPTHER

## 2024-11-08 NOTE — TELEPHONE ENCOUNTER
Received request via: Pharmacy    Was the patient seen in the last year in this department? Yes    Does the patient have an active prescription (recently filled or refills available) for medication(s) requested? No    Pharmacy Name: [facility]     Does the patient have detention Plus and need 100-day supply? (This applies to ALL medications) Patient does not have SCP

## 2024-11-09 ENCOUNTER — HOSPITAL ENCOUNTER (OUTPATIENT)
Facility: MEDICAL CENTER | Age: 64
End: 2024-11-09
Attending: FAMILY MEDICINE
Payer: COMMERCIAL

## 2024-11-09 ENCOUNTER — OFFICE VISIT (OUTPATIENT)
Dept: URGENT CARE | Facility: PHYSICIAN GROUP | Age: 64
End: 2024-11-09
Payer: COMMERCIAL

## 2024-11-09 VITALS
RESPIRATION RATE: 21 BRPM | DIASTOLIC BLOOD PRESSURE: 78 MMHG | WEIGHT: 306.66 LBS | OXYGEN SATURATION: 98 % | HEIGHT: 72 IN | HEART RATE: 120 BPM | BODY MASS INDEX: 41.54 KG/M2 | TEMPERATURE: 97.5 F | SYSTOLIC BLOOD PRESSURE: 132 MMHG

## 2024-11-09 DIAGNOSIS — R00.0 TACHYCARDIA: ICD-10-CM

## 2024-11-09 DIAGNOSIS — R30.0 DYSURIA: ICD-10-CM

## 2024-11-09 DIAGNOSIS — N39.0 ACUTE UTI: ICD-10-CM

## 2024-11-09 LAB
APPEARANCE UR: NORMAL
BILIRUB UR STRIP-MCNC: NEGATIVE MG/DL
COLOR UR AUTO: NORMAL
GLUCOSE UR STRIP.AUTO-MCNC: NEGATIVE MG/DL
KETONES UR STRIP.AUTO-MCNC: NORMAL MG/DL
LEUKOCYTE ESTERASE UR QL STRIP.AUTO: NORMAL
NITRITE UR QL STRIP.AUTO: NEGATIVE
PH UR STRIP.AUTO: 6.5 [PH] (ref 5–8)
PROT UR QL STRIP: NORMAL MG/DL
RBC UR QL AUTO: NORMAL
SP GR UR STRIP.AUTO: 1.02
UROBILINOGEN UR STRIP-MCNC: NORMAL MG/DL

## 2024-11-09 PROCEDURE — 87186 SC STD MICRODIL/AGAR DIL: CPT

## 2024-11-09 PROCEDURE — 3075F SYST BP GE 130 - 139MM HG: CPT | Performed by: FAMILY MEDICINE

## 2024-11-09 PROCEDURE — 87077 CULTURE AEROBIC IDENTIFY: CPT | Mod: 91

## 2024-11-09 PROCEDURE — 87086 URINE CULTURE/COLONY COUNT: CPT

## 2024-11-09 PROCEDURE — 3078F DIAST BP <80 MM HG: CPT | Performed by: FAMILY MEDICINE

## 2024-11-09 PROCEDURE — 81002 URINALYSIS NONAUTO W/O SCOPE: CPT | Performed by: FAMILY MEDICINE

## 2024-11-09 PROCEDURE — 99214 OFFICE O/P EST MOD 30 MIN: CPT | Performed by: FAMILY MEDICINE

## 2024-11-09 RX ORDER — SULFAMETHOXAZOLE AND TRIMETHOPRIM 800; 160 MG/1; MG/1
1 TABLET ORAL EVERY 12 HOURS
Qty: 14 TABLET | Refills: 0 | Status: SHIPPED | OUTPATIENT
Start: 2024-11-09 | End: 2024-11-16

## 2024-11-09 ASSESSMENT — FIBROSIS 4 INDEX: FIB4 SCORE: 1.09

## 2024-11-10 DIAGNOSIS — N39.0 ACUTE UTI: ICD-10-CM

## 2024-11-11 ASSESSMENT — ENCOUNTER SYMPTOMS
EYE REDNESS: 0
MYALGIAS: 0
NAUSEA: 0
WEIGHT LOSS: 0
EYE DISCHARGE: 0
VOMITING: 0

## 2024-11-11 NOTE — PROGRESS NOTES
Subjective     Ramirez Harmon is a 64 y.o. male who presents with Dysuria (Sx started Thursday with sever lower back pain, frequent urination, sx seem worse today, )            2 days frequent urination.  Pain with urination.  Hematuria.  No flank pain.  No fever.  No PMH kidney stone.  No PMH BPH. He does have PMH of urosepsis. No other aggravating or alleviating factors.          Review of Systems   Constitutional:  Negative for malaise/fatigue and weight loss.   Eyes:  Negative for discharge and redness.   Gastrointestinal:  Negative for nausea and vomiting.   Musculoskeletal:  Negative for joint pain and myalgias.   Skin:  Negative for itching and rash.              Objective     /78 (BP Location: Left arm, Patient Position: Sitting, BP Cuff Size: Large adult long)   Pulse (!) 120   Temp 36.4 °C (97.5 °F) (Temporal)   Resp (!) 21   Ht 1.829 m (6')   Wt (!) 139 kg (306 lb 10.6 oz)   SpO2 98%   BMI 41.59 kg/m²      Physical Exam  Constitutional:       General: He is not in acute distress.     Appearance: He is well-developed.   HENT:      Head: Normocephalic and atraumatic.   Eyes:      Conjunctiva/sclera: Conjunctivae normal.   Cardiovascular:      Rate and Rhythm: Regular rhythm. Tachycardia present.      Heart sounds: Normal heart sounds. No murmur heard.  Pulmonary:      Effort: Pulmonary effort is normal.      Breath sounds: Normal breath sounds. No wheezing.   Abdominal:      Palpations: Abdomen is soft.      Tenderness: There is no abdominal tenderness. There is no right CVA tenderness or left CVA tenderness.   Skin:     General: Skin is warm and dry.      Findings: No rash.   Neurological:      Mental Status: He is alert.                             Assessment & Plan   UA reviewed       Assessment & Plan  Dysuria    Orders:    POCT Urinalysis    Acute UTI    Orders:    URINE CULTURE(NEW); Future    sulfamethoxazole-trimethoprim (BACTRIM DS) 800-160 MG tablet; Take 1 Tablet by mouth every 12  hours for 7 days.    Tachycardia       .Differential diagnosis, natural history, supportive care, and indications for immediate follow-up were discussed.           Tachycardia concerning for early sepsis.  Will initiate antibiotic and follow-up culture.  If symptoms worsen or he feels feverish patient and family understand to go to the ED for further evaluation.

## 2024-11-14 LAB
BACTERIA UR CULT: ABNORMAL
SIGNIFICANT IND 70042: ABNORMAL
SITE SITE: ABNORMAL
SOURCE SOURCE: ABNORMAL

## 2024-11-14 NOTE — TELEPHONE ENCOUNTER
Received request via: Patient    Was the patient seen in the last year in this department? Yes    Does the patient have an active prescription (recently filled or refills available) for medication(s) requested? Yes. Pharmacy change    Pharmacy Name:   [facility] [place] [facility] - [address] [address]  Phone: [phone] Fax: [phone]      Does the patient have prison Plus and need 100-day supply? (This applies to ALL medications) Patient does not have SCP

## 2024-11-15 RX ORDER — AMOXICILLIN 875 MG/1
875 TABLET, COATED ORAL 2 TIMES DAILY
Qty: 14 TABLET | Refills: 0 | Status: SHIPPED | OUTPATIENT
Start: 2024-11-15 | End: 2024-11-22

## 2024-11-19 ENCOUNTER — HOSPITAL ENCOUNTER (OUTPATIENT)
Dept: LAB | Facility: MEDICAL CENTER | Age: 64
End: 2024-11-19
Attending: FAMILY MEDICINE
Payer: COMMERCIAL

## 2024-11-19 DIAGNOSIS — D64.9 ANEMIA, UNSPECIFIED TYPE: ICD-10-CM

## 2024-11-19 DIAGNOSIS — E03.8 OTHER SPECIFIED HYPOTHYROIDISM: ICD-10-CM

## 2024-11-19 DIAGNOSIS — E55.9 VITAMIN D DEFICIENCY: ICD-10-CM

## 2024-11-19 DIAGNOSIS — E78.5 DYSLIPIDEMIA: ICD-10-CM

## 2024-11-19 DIAGNOSIS — I10 ESSENTIAL HYPERTENSION, BENIGN: ICD-10-CM

## 2024-11-19 LAB
25(OH)D3 SERPL-MCNC: 35 NG/ML (ref 30–100)
ALBUMIN SERPL BCP-MCNC: 4.4 G/DL (ref 3.2–4.9)
ALBUMIN/GLOB SERPL: 1.2 G/DL
ALP SERPL-CCNC: 86 U/L (ref 30–99)
ALT SERPL-CCNC: 18 U/L (ref 2–50)
ANION GAP SERPL CALC-SCNC: 16 MMOL/L (ref 7–16)
AST SERPL-CCNC: 15 U/L (ref 12–45)
BASOPHILS # BLD AUTO: 0.8 % (ref 0–1.8)
BASOPHILS # BLD: 0.08 K/UL (ref 0–0.12)
BILIRUB SERPL-MCNC: 0.5 MG/DL (ref 0.1–1.5)
BUN SERPL-MCNC: 16 MG/DL (ref 8–22)
CALCIUM ALBUM COR SERPL-MCNC: 9.1 MG/DL (ref 8.5–10.5)
CALCIUM SERPL-MCNC: 9.4 MG/DL (ref 8.5–10.5)
CHLORIDE SERPL-SCNC: 99 MMOL/L (ref 96–112)
CHOLEST SERPL-MCNC: 144 MG/DL (ref 100–199)
CO2 SERPL-SCNC: 22 MMOL/L (ref 20–33)
CREAT SERPL-MCNC: 0.95 MG/DL (ref 0.5–1.4)
EOSINOPHIL # BLD AUTO: 0.23 K/UL (ref 0–0.51)
EOSINOPHIL NFR BLD: 2.3 % (ref 0–6.9)
ERYTHROCYTE [DISTWIDTH] IN BLOOD BY AUTOMATED COUNT: 41.7 FL (ref 35.9–50)
FASTING STATUS PATIENT QL REPORTED: NORMAL
FERRITIN SERPL-MCNC: 158 NG/ML (ref 22–322)
GFR SERPLBLD CREATININE-BSD FMLA CKD-EPI: 90 ML/MIN/1.73 M 2
GLOBULIN SER CALC-MCNC: 3.7 G/DL (ref 1.9–3.5)
GLUCOSE SERPL-MCNC: 124 MG/DL (ref 65–99)
HCT VFR BLD AUTO: 47 % (ref 42–52)
HDLC SERPL-MCNC: 39 MG/DL
HGB BLD-MCNC: 16 G/DL (ref 14–18)
IMM GRANULOCYTES # BLD AUTO: 0.07 K/UL (ref 0–0.11)
IMM GRANULOCYTES NFR BLD AUTO: 0.7 % (ref 0–0.9)
IRON SATN MFR SERPL: 30 % (ref 15–55)
IRON SERPL-MCNC: 90 UG/DL (ref 50–180)
LDLC SERPL CALC-MCNC: 82 MG/DL
LYMPHOCYTES # BLD AUTO: 2.24 K/UL (ref 1–4.8)
LYMPHOCYTES NFR BLD: 22.4 % (ref 22–41)
MCH RBC QN AUTO: 29.9 PG (ref 27–33)
MCHC RBC AUTO-ENTMCNC: 34 G/DL (ref 32.3–36.5)
MCV RBC AUTO: 87.7 FL (ref 81.4–97.8)
MONOCYTES # BLD AUTO: 1.02 K/UL (ref 0–0.85)
MONOCYTES NFR BLD AUTO: 10.2 % (ref 0–13.4)
NEUTROPHILS # BLD AUTO: 6.38 K/UL (ref 1.82–7.42)
NEUTROPHILS NFR BLD: 63.6 % (ref 44–72)
NRBC # BLD AUTO: 0 K/UL
NRBC BLD-RTO: 0 /100 WBC (ref 0–0.2)
PLATELET # BLD AUTO: 328 K/UL (ref 164–446)
PMV BLD AUTO: 9.6 FL (ref 9–12.9)
POTASSIUM SERPL-SCNC: 4.3 MMOL/L (ref 3.6–5.5)
PROT SERPL-MCNC: 8.1 G/DL (ref 6–8.2)
RBC # BLD AUTO: 5.36 M/UL (ref 4.7–6.1)
SODIUM SERPL-SCNC: 137 MMOL/L (ref 135–145)
TIBC SERPL-MCNC: 300 UG/DL (ref 250–450)
TRIGL SERPL-MCNC: 115 MG/DL (ref 0–149)
TSH SERPL-ACNC: 4.38 UIU/ML (ref 0.35–5.5)
UIBC SERPL-MCNC: 210 UG/DL (ref 110–370)
WBC # BLD AUTO: 10 K/UL (ref 4.8–10.8)

## 2024-11-19 PROCEDURE — 83540 ASSAY OF IRON: CPT

## 2024-11-19 PROCEDURE — 84443 ASSAY THYROID STIM HORMONE: CPT

## 2024-11-19 PROCEDURE — 80061 LIPID PANEL: CPT

## 2024-11-19 PROCEDURE — 85025 COMPLETE CBC W/AUTO DIFF WBC: CPT

## 2024-11-19 PROCEDURE — 83550 IRON BINDING TEST: CPT

## 2024-11-19 PROCEDURE — 80053 COMPREHEN METABOLIC PANEL: CPT

## 2024-11-19 PROCEDURE — 82306 VITAMIN D 25 HYDROXY: CPT

## 2024-11-19 PROCEDURE — 36415 COLL VENOUS BLD VENIPUNCTURE: CPT

## 2024-11-19 PROCEDURE — 82728 ASSAY OF FERRITIN: CPT

## 2024-11-26 ENCOUNTER — APPOINTMENT (OUTPATIENT)
Dept: MEDICAL GROUP | Facility: PHYSICIAN GROUP | Age: 64
End: 2024-11-26
Payer: COMMERCIAL

## 2024-11-26 VITALS
WEIGHT: 291 LBS | HEIGHT: 72 IN | BODY MASS INDEX: 39.42 KG/M2 | TEMPERATURE: 97.5 F | OXYGEN SATURATION: 97 % | HEART RATE: 107 BPM | DIASTOLIC BLOOD PRESSURE: 74 MMHG | SYSTOLIC BLOOD PRESSURE: 128 MMHG | RESPIRATION RATE: 16 BRPM

## 2024-11-26 DIAGNOSIS — R41.3 MEMORY DYSFUNCTION: ICD-10-CM

## 2024-11-26 DIAGNOSIS — R80.9 TYPE 2 DIABETES MELLITUS WITH MICROALBUMINURIA, WITHOUT LONG-TERM CURRENT USE OF INSULIN (HCC): ICD-10-CM

## 2024-11-26 DIAGNOSIS — D64.9 ANEMIA, UNSPECIFIED TYPE: ICD-10-CM

## 2024-11-26 DIAGNOSIS — K21.9 GASTROESOPHAGEAL REFLUX DISEASE WITHOUT ESOPHAGITIS: ICD-10-CM

## 2024-11-26 DIAGNOSIS — E55.9 VITAMIN D DEFICIENCY: ICD-10-CM

## 2024-11-26 DIAGNOSIS — E78.5 DYSLIPIDEMIA: ICD-10-CM

## 2024-11-26 DIAGNOSIS — F41.9 ANXIETY: ICD-10-CM

## 2024-11-26 DIAGNOSIS — I10 ESSENTIAL HYPERTENSION, BENIGN: ICD-10-CM

## 2024-11-26 DIAGNOSIS — E11.29 TYPE 2 DIABETES MELLITUS WITH MICROALBUMINURIA, WITHOUT LONG-TERM CURRENT USE OF INSULIN (HCC): ICD-10-CM

## 2024-11-26 PROCEDURE — 3074F SYST BP LT 130 MM HG: CPT | Performed by: FAMILY MEDICINE

## 2024-11-26 PROCEDURE — 3078F DIAST BP <80 MM HG: CPT | Performed by: FAMILY MEDICINE

## 2024-11-26 PROCEDURE — 99214 OFFICE O/P EST MOD 30 MIN: CPT | Performed by: FAMILY MEDICINE

## 2024-11-26 RX ORDER — FLUOXETINE 40 MG/1
40 CAPSULE ORAL DAILY
Qty: 90 CAPSULE | Refills: 3 | Status: SHIPPED | OUTPATIENT
Start: 2024-11-26

## 2024-11-26 RX ORDER — CHLORTHALIDONE 25 MG/1
25 TABLET ORAL DAILY
Qty: 90 TABLET | Refills: 1 | Status: SHIPPED | OUTPATIENT
Start: 2024-11-26

## 2024-11-26 RX ORDER — ESOMEPRAZOLE MAGNESIUM 40 MG/1
40 CAPSULE, DELAYED RELEASE ORAL
Qty: 90 CAPSULE | Refills: 0 | Status: SHIPPED | OUTPATIENT
Start: 2024-11-26

## 2024-11-26 ASSESSMENT — FIBROSIS 4 INDEX: FIB4 SCORE: 0.67

## 2024-11-26 NOTE — PROGRESS NOTES
Subjective:     CC:   Chief Complaint   Patient presents with    Follow-Up       HPI:   Ramirez presents today for follow-up of his labs.  Patient is feeling happy because his weight is decreasing with the Mounjaro.    Past Medical History:   Diagnosis Date    Diabetes     Hyperlipidemia     Hypertension        Social History     Tobacco Use    Smoking status: Former     Current packs/day: 0.00     Types: Cigarettes     Quit date: 1980     Years since quittin.0    Smokeless tobacco: Former     Types: Chew     Quit date: 1980    Tobacco comments:     NOT REALLY SURE WHAT YEAR HE QUIT SMOKING & CHEW   Vaping Use    Vaping status: Never Used   Substance Use Topics    Alcohol use: No    Drug use: Yes     Types: Marijuana     Comment: now and then       Current Outpatient Medications Ordered in Epic   Medication Sig Dispense Refill    Tirzepatide (MOUNJARO) 10 MG/0.5ML Solution Pen-injector Inject 1 Pen under the skin every 7 days. 6 mL 0    esomeprazole (NEXIUM) 40 MG delayed-release capsule Take 1 Capsule by mouth every morning before breakfast. 90 Capsule 0    atorvastatin (LIPITOR) 40 MG Tab Take 1 Tablet by mouth every evening. 90 Tablet 3    chlorthalidone (HYGROTON) 25 MG Tab Take 1 Tablet by mouth every day. 90 Tablet 3    fluoxetine (PROZAC) 40 MG capsule Take 1 Capsule by mouth every day. 90 Capsule 3    levothyroxine (SYNTHROID) 25 MCG Tab Take 1 Tablet by mouth every morning on an empty stomach. 90 Tablet 2    metformin (GLUCOPHAGE) 1000 MG tablet Take 1 Tablet by mouth 2 times a day with meals. 180 Tablet 3    lisinopril (PRINIVIL) 10 MG Tab Take 2 Tablets by mouth every day. 90 Tablet 3    Blood Glucose Monitoring Suppl (ONE TOUCH ULTRA 2) w/Device Kit Use to test blood glucose one to two times daily 1 Kit 0    glucose blood (ONETOUCH ULTRA) strip Use to test blood glucose one to two times daily 100 Strip 4    ONE TOUCH ULTRASOFT LANCETS Misc Use to test blood glucose one to two times daily 100  Each 4     No current [facility]-ordered facility-administered medications on file.       Allergies:  Patient has no known allergies.    Health Maintenance: Completed    ROS:  Gen: no fevers/chills, patient has lost 25 pounds in the month  Eyes: no changes in vision  ENT: no sore throat, no hearing loss, no bloody nose  Pulm: no sob, no cough  CV: no chest pain, no palpitations  GI: no nausea/vomiting, no diarrhea  : no dysuria  Neuro: no headaches, no numbness/tingling  Heme/Lymph: no easy bruising    Objective:     Exam:  /74 (BP Location: Right arm, Patient Position: Sitting, BP Cuff Size: Adult)   Pulse (!) 107   Temp 36.4 °C (97.5 °F) (Temporal)   Resp 16   Ht 1.829 m (6')   Wt (!) 132 kg (291 lb)   SpO2 97%   BMI 39.47 kg/m²  Body mass index is 39.47 kg/m².    Gen: Alert and oriented, No apparent distress.  Skin: Warm and dry.  No obvious lesions.  Eyes: Sclera wnl Pupils normal in size  Lungs: Normal effort, CTA bilaterally, no wheezes, rhonchi, or rales  CV: Regular rate and rhythm. No murmurs, rubs, or gallops.  Musculoskeletal: Normal gait. No extremity cyanosis, clubbing, or edema.  Neuro: Oriented to person, place and time  Psych: Mood is wnl         Assessment & Plan:     64 y.o. male with the following -     1. Type 2 diabetes mellitus with microalbuminuria, without long-term current use of insulin (HCC)  Patient right now is not doing his fingersticks would recommend he start doing especially if he feels like his blood sugar is dropping.  Patient does have appointment with pharmacotherapy coming up in January for follow-up.  I would recommend patient increase activity level at this point discussed sending him to physical [facility] to help him with core strengthening exercises patient is not interested in doing this.    2. Anemia, unspecified type  Patient's CBC is within normal limits    3. Dyslipidemia  Patient's lipid panel looks within normal limits except his HDL has dropped to 39  again stressed to him increase activity will increase his good cholesterol.    4. Essential hypertension, benign  Patient's blood pressure looks in good control continue present medication    5. Vitamin D deficiency  Patient to continue his present vitamin D3       Return in about 6 months (around 5/26/2025), or if symptoms worsen or fail to improve.  I did have patient's sign a release so we can get his most recent eye exam findings    Please note that this dictation was created using voice recognition software. I have made every reasonable attempt to correct obvious errors, but I expect that there are errors of grammar and possibly content that I did not discover before finalizing the note.

## 2024-11-26 NOTE — LETTER
[facility]  Rosalie Colby M.D.  [address]  [place] [address]  Fax: [phone]   Authorization for Release/Disclosure of   Protected Health Information   Name: RAMIREZ HARMON : 1960 SSN: [id number]   Address: [address] [place], [place] [address]  [place] [address] Phone:    There are no phone numbers on file.   I authorize the entity listed below to release/disclose the PHI below to:   [facility]/Rosalie Colby M.D. and Rosalie Colby M.D.   Provider or Entity Name:     Address   City, State, [facility]   Phone:      Fax:     Reason for request: continuity of care   Information to be released:    [  ] LAST COLONOSCOPY,  including any PATH REPORT and follow-up  [  ] LAST FIT/COLOGUARD RESULT [  ] LAST DEXA  [  ] LAST MAMMOGRAM  [  ] LAST PAP  [  ] LAST LABS [ xx ] RETINA EXAM REPORT  [  ] IMMUNIZATION RECORDS  [  ] Release all info      [  ] Check here and initial the line next to each item to release ALL health information INCLUDING  _____ Care and treatment for drug and / or alcohol abuse  _____ HIV testing, infection status, or AIDS  _____ Genetic Testing    DATES OF SERVICE OR TIME PERIOD TO BE DISCLOSED: _____________  I understand and acknowledge that:  * This Authorization may be revoked at any time by you in writing, except if your health information has already been used or disclosed.  * Your health information that will be used or disclosed as a result of you signing this authorization could be re-disclosed by the recipient. If this occurs, your re-disclosed health information may no longer be protected by State or Federal laws.  * You may refuse to sign this Authorization. Your refusal will not affect your ability to obtain treatment.  * This Authorization becomes effective upon signing and will  on (date) __________.      If no date is indicated, this Authorization will  one (1) year from the signature date.    Name: Ramirez Harmon  Signature: Date:   2024     PLEASE FAX REQUESTED  RECORDS BACK TO: [phone]

## 2024-11-30 ENCOUNTER — HOSPITAL ENCOUNTER (OUTPATIENT)
Facility: MEDICAL CENTER | Age: 64
End: 2024-11-30
Attending: PHYSICIAN ASSISTANT
Payer: COMMERCIAL

## 2024-11-30 ENCOUNTER — OFFICE VISIT (OUTPATIENT)
Dept: URGENT CARE | Facility: PHYSICIAN GROUP | Age: 64
End: 2024-11-30
Payer: COMMERCIAL

## 2024-11-30 VITALS
DIASTOLIC BLOOD PRESSURE: 76 MMHG | RESPIRATION RATE: 16 BRPM | HEIGHT: 72 IN | OXYGEN SATURATION: 97 % | BODY MASS INDEX: 39.67 KG/M2 | SYSTOLIC BLOOD PRESSURE: 129 MMHG | WEIGHT: 292.88 LBS | TEMPERATURE: 97.3 F | HEART RATE: 106 BPM

## 2024-11-30 DIAGNOSIS — N39.0 COMPLICATED UTI (URINARY TRACT INFECTION): ICD-10-CM

## 2024-11-30 DIAGNOSIS — N39.0 RECURRENT UTI: ICD-10-CM

## 2024-11-30 LAB
APPEARANCE UR: NORMAL
BILIRUB UR STRIP-MCNC: NORMAL MG/DL
COLOR UR AUTO: NORMAL
GLUCOSE UR STRIP.AUTO-MCNC: NEGATIVE MG/DL
KETONES UR STRIP.AUTO-MCNC: NORMAL MG/DL
LEUKOCYTE ESTERASE UR QL STRIP.AUTO: NORMAL
NITRITE UR QL STRIP.AUTO: NEGATIVE
PH UR STRIP.AUTO: 7 [PH] (ref 5–8)
PROT UR QL STRIP: NORMAL MG/DL
RBC UR QL AUTO: NORMAL
SP GR UR STRIP.AUTO: 1.02
UROBILINOGEN UR STRIP-MCNC: NORMAL MG/DL

## 2024-11-30 PROCEDURE — 87086 URINE CULTURE/COLONY COUNT: CPT

## 2024-11-30 RX ORDER — DOXYCYCLINE HYCLATE 100 MG
100 TABLET ORAL 2 TIMES DAILY
Qty: 14 TABLET | Refills: 0 | Status: SHIPPED | OUTPATIENT
Start: 2024-11-30 | End: 2024-12-07

## 2024-11-30 ASSESSMENT — ENCOUNTER SYMPTOMS
FLANK PAIN: 0
DIARRHEA: 0
CONSTITUTIONAL NEGATIVE: 1
ABDOMINAL PAIN: 0
CARDIOVASCULAR NEGATIVE: 1
VOMITING: 0
NAUSEA: 0
RESPIRATORY NEGATIVE: 1

## 2024-11-30 ASSESSMENT — FIBROSIS 4 INDEX: FIB4 SCORE: 0.67

## 2024-11-30 NOTE — PROGRESS NOTES
Subjective:     Ramirez Harmon  is a 64 y.o. male who presents for UTI (The UTI came back. Was prescribed two meds. Amoxicillin and Sulfatrim/Needs to get meds /)       He presents today with urinary frequency that has been ongoing since last office visit.  He denies any painful urination, no fevers, no flank pain.  No testicular pain or swelling.  No penile drainage or discharge. Please recall he was seen on 11/9/2024 for urinary tract infection, subsequent urine culture from that date did reveal E. coli and actinomyces sp.,  Patient was started on Bactrim and amoxicillin for coverage of these bacteria which did show susceptibility to these antibiotics.  He did take these antibiotics as prescribed but states the symptoms never fully resolved.       Review of Systems   Constitutional: Negative.    Respiratory: Negative.     Cardiovascular: Negative.    Gastrointestinal:  Negative for abdominal pain, diarrhea, nausea and vomiting.   Genitourinary:  Positive for frequency. Negative for dysuria, flank pain, hematuria and urgency.      No Known Allergies  Past Medical History:   Diagnosis Date    Diabetes     Hyperlipidemia     Hypertension         Objective:   There were no vitals taken for this visit.  Physical Exam  Vitals and nursing note reviewed.   Constitutional:       General: He is not in acute distress.     Appearance: Normal appearance. He is not ill-appearing, toxic-appearing or diaphoretic.   HENT:      Head: Normocephalic.   Eyes:      General: No scleral icterus.     Conjunctiva/sclera: Conjunctivae normal.   Cardiovascular:      Rate and Rhythm: Normal rate and regular rhythm.   Pulmonary:      Effort: Pulmonary effort is normal. No respiratory distress.      Breath sounds: Normal breath sounds. No stridor. No wheezing or rhonchi.   Abdominal:      General: Abdomen is flat. Bowel sounds are normal. There is no distension.      Palpations: Abdomen is soft.      Tenderness: There is no abdominal  tenderness. There is no right CVA tenderness, left CVA tenderness or guarding.   Musculoskeletal:      Cervical back: Neck supple.   Neurological:      Mental Status: He is alert and oriented to person, place, and time.   Psychiatric:         Mood and Affect: Mood normal.         Behavior: Behavior normal.         Thought Content: Thought content normal.         Judgment: Judgment normal.             Diagnostic testing:    POC urinalysis-small bilirubin, trace ketones, trace blood, positive protein, positive euro bilirubin, small leukocytes, all others within normal limits    Urine culture-pending    Assessment/Plan:     Encounter Diagnoses   Name Primary?    Complicated UTI (urinary tract infection)     Recurrent UTI           Plan for care for today's complaint includes obtaining urinalysis today as patient is urinary symptoms have not fully resolved since his initial visit on 11/9/2024.  Urinalysis today did show signs of a continued urinary tract infection.  I did review the urine culture results on 11/9/2024 and based on those results and sensitivities we will start the patient on doxycycline at this time.  Did obtain a new urine culture today; will contact the patient via [employer] message to discuss the results of the testing obtained today, will adjust treatment plan accordingly.  Due to recurrent nature of the patient's urinary tract symptoms I did place a referral to urology for ongoing evaluation management.  No has a pyelonephritis on exam.  Vital signs were stable during today's office visit, patient was overall well-appearing. Continue to monitor symptoms and return to urgent care or follow-up with primary care provider if symptoms remain ongoing.  Follow-up in the emergency department if symptoms become severe, ER precautions discussed in office today..  Prescription for doxycycline provided.    See AVS Instructions below for written guidance provided to patient on after-visit management and care in  addition to our verbal discussion during the visit.    Please note that this dictation was created using voice recognition software. I have attempted to correct all errors, but there may be sound-alike, spelling, grammar and possibly content errors that I did not discover before finalizing the note.    Raj Estrada PA-C

## 2024-12-02 LAB
BACTERIA UR CULT: NORMAL
SIGNIFICANT IND 70042: NORMAL
SITE SITE: NORMAL
SOURCE SOURCE: NORMAL

## 2024-12-02 RX ORDER — LEVOTHYROXINE SODIUM 25 UG/1
25 TABLET ORAL
Qty: 90 TABLET | Refills: 3 | Status: SHIPPED | OUTPATIENT
Start: 2024-12-02

## 2024-12-02 NOTE — TELEPHONE ENCOUNTER
Received request via: Pharmacy    Was the patient seen in the last year in this department? Yes    Does the patient have an active prescription (recently filled or refills available) for medication(s) requested? No    Pharmacy Name:   [facility] - [address] [address]  Phone: [phone] Fax: [phone]      Does the patient have detention Plus and need 100-day supply? (This applies to ALL medications) Patient does not have SCP

## 2025-01-07 ENCOUNTER — OFFICE VISIT (OUTPATIENT)
Dept: UROLOGY | Facility: MEDICAL CENTER | Age: 65
End: 2025-01-07
Payer: COMMERCIAL

## 2025-01-07 ENCOUNTER — HOSPITAL ENCOUNTER (OUTPATIENT)
Facility: MEDICAL CENTER | Age: 65
End: 2025-01-07
Attending: UROLOGY
Payer: COMMERCIAL

## 2025-01-07 DIAGNOSIS — N39.0 COMPLICATED UTI (URINARY TRACT INFECTION): ICD-10-CM

## 2025-01-07 DIAGNOSIS — N39.0 RECURRENT UTI: ICD-10-CM

## 2025-01-07 DIAGNOSIS — N39.41 URGE INCONTINENCE: ICD-10-CM

## 2025-01-07 LAB
APPEARANCE UR: CLEAR
BILIRUB UR STRIP-MCNC: NORMAL MG/DL
COLOR UR AUTO: YELLOW
GLUCOSE UR STRIP.AUTO-MCNC: NORMAL MG/DL
KETONES UR STRIP.AUTO-MCNC: NORMAL MG/DL
LEUKOCYTE ESTERASE UR QL STRIP.AUTO: NORMAL
NITRITE UR QL STRIP.AUTO: NORMAL
PH UR STRIP.AUTO: 7 [PH] (ref 5–8)
POC POST-VOID: 137 ML
POC PRE-VOID: 377 ML
PROT UR QL STRIP: 30 MG/DL
RBC UR QL AUTO: NORMAL
SP GR UR STRIP.AUTO: 1.02
UROBILINOGEN UR STRIP-MCNC: 4 MG/DL

## 2025-01-07 PROCEDURE — 51798 US URINE CAPACITY MEASURE: CPT | Performed by: UROLOGY

## 2025-01-07 PROCEDURE — 99203 OFFICE O/P NEW LOW 30 MIN: CPT | Mod: 25 | Performed by: UROLOGY

## 2025-01-07 PROCEDURE — 87077 CULTURE AEROBIC IDENTIFY: CPT

## 2025-01-07 PROCEDURE — 81002 URINALYSIS NONAUTO W/O SCOPE: CPT | Performed by: UROLOGY

## 2025-01-07 PROCEDURE — 87186 SC STD MICRODIL/AGAR DIL: CPT

## 2025-01-07 PROCEDURE — 87086 URINE CULTURE/COLONY COUNT: CPT

## 2025-01-07 NOTE — PROGRESS NOTES
Chief Complaint: recurrent UTI's    HPI: Ramirez Harmon is a 64 y.o. male with a history of diabetes, hypertension, hyperlipidemia, and obesity, here for evaluation of recurrent UTI's.     He was admitted at another hospital in 2023 for a severe UTI with sepsis, and then had another urinary tract infection in 2024 (E coli and Actinomyces).     POCT urinalysis today is positive for another infection, though he does not have any significant dysuria, and no gross hematuria or increased frequency and urgency.     Meanwhile, he has had a couple years of sudden episodes of incontinence with or without urge, but no incontinence with coughing/sneezing/lifting/straining.      Symptoms:  Frequency: yes  Urgency: yes  Nocturia: yes x 2  Stress incontinence: no  Urge incontinence: yes  Dysuria: no  Gross hematuria: no  Weakened stream: yes  Strain to empty: no    IPSS: 14    Past Medical History:  Past Medical History:   Diagnosis Date    Diabetes     Hyperlipidemia     Hypertension        Past Surgical History:  Past Surgical History:   Procedure Laterality Date    TRACHEOSTOMY         Family History:  Family History   Problem Relation Age of Onset    Stroke Mother     Heart Disease Father     Heart Disease Maternal Grandmother     Heart Disease Maternal Grandfather     Heart Disease Paternal Grandmother     Heart Disease Paternal Grandfather     Cancer Neg Hx        Social History:  Social History     Socioeconomic History    Marital status:      Spouse name: Not on file    Number of children: Not on file    Years of education: Not on file    Highest education level: Not on file   Occupational History    Not on file   Tobacco Use    Smoking status: Former     Current packs/day: 0.00     Types: Cigarettes     Quit date: 1980     Years since quittin.1    Smokeless tobacco: Former     Types: Chew     Quit date: 1980    Tobacco comments:     NOT REALLY SURE WHAT YEAR HE QUIT SMOKING & CHEW    Vaping Use    Vaping status: Never Used   Substance and Sexual Activity    Alcohol use: No    Drug use: Yes     Types: Marijuana     Comment: now and then    Sexual activity: Not on file   Other Topics Concern    Not on file   Social History Narrative    Not on file     Social Drivers of Health     Financial Resource Strain: Not on file   Food Insecurity: Not on file   Transportation Needs: Not on file   Physical Activity: Not on file   Stress: Not on file   Social Connections: Not on file   Intimate Partner Violence: Not on file   Housing Stability: Not on file       Medications:  Current Outpatient Medications   Medication Sig Dispense Refill    amoxicillin-clavulanate (AUGMENTIN) 875-125 MG Tab Take 1 Tablet by mouth 2 times a day for 7 days. 14 Tablet 0    levothyroxine (SYNTHROID) 25 MCG Tab TAKE 1 TABLET EVERY MORNING ON AN EMPTY STOMACH 90 Tablet 3    chlorthalidone (HYGROTON) 25 MG Tab Take 1 Tablet by mouth every day. 90 Tablet 1    esomeprazole (NEXIUM) 40 MG delayed-release capsule Take 1 Capsule by mouth every morning before breakfast. 90 Capsule 0    fluoxetine (PROZAC) 40 MG capsule Take 1 Capsule by mouth every day. 90 Capsule 3    Tirzepatide (MOUNJARO) 10 MG/0.5ML Solution Pen-injector Inject 1 Pen under the skin every 7 days. 6 mL 0    atorvastatin (LIPITOR) 40 MG Tab Take 1 Tablet by mouth every evening. 90 Tablet 3    Blood Glucose Monitoring Suppl (ONE TOUCH ULTRA 2) w/Device Kit Use to test blood glucose one to two times daily 1 Kit 0    glucose blood (ONETOUCH ULTRA) strip Use to test blood glucose one to two times daily 100 Strip 4    ONE TOUCH ULTRASOFT LANCETS Misc Use to test blood glucose one to two times daily 100 Each 4    metformin (GLUCOPHAGE) 1000 MG tablet Take 1 Tablet by mouth 2 times a day with meals. 180 Tablet 3    lisinopril (PRINIVIL) 10 MG Tab Take 2 Tablets by mouth every day. 90 Tablet 3     No current facility-administered medications for this visit.        Allergies:  No Known Allergies    Review of Systems:  Constitutional: Negative for fever, chills and malaise/fatigue.   HENT: Negative for congestion.    Eyes: Negative for pain.   Respiratory: Negative for cough and shortness of breath.    Cardiovascular: Negative for leg swelling.   Gastrointestinal: Negative for nausea, vomiting, abdominal pain and diarrhea.   Genitourinary: Negative for dysuria and hematuria.   Skin: Negative for rash.   Neurological: Negative for dizziness, focal weakness and headaches.   Endo/Heme/Allergies: Does not bruise/bleed easily.   Psychiatric/Behavioral: Negative for depression.  The patient is not nervous/anxious.        Physical Exam:  There were no vitals filed for this visit.    GENERAL: well appearing, well nourished, NAD  RESP: respiratory effort normal  ABDOMEN: soft, nontender, nondistended, no masses or organomegaly  HERNIAS: no hernias found on exam  SKIN/LYMPH: normal coloration and turgor, no suspicious skin lesions noted  NEURO/PSYCH: alert, oriented, normal speech, no focal findings or movement disorder noted  EXTREMITIES: no pedal edema noted  GENITOURINARY: normal male external genitalia    Data Review:    Labs:  POCT UA   Lab Results   Component Value Date/Time    POCCOLOR yellow 01/07/2025 10:48 AM    POCAPPEAR clear 01/07/2025 10:48 AM    POCLEUKEST small 01/07/2025 10:48 AM    POCNITRITE pos 01/07/2025 10:48 AM    POCUROBILIGE 4.0 01/07/2025 10:48 AM    POCPROTEIN 30 01/07/2025 10:48 AM    POCURPH 7.0 01/07/2025 10:48 AM    POCBLOOD trace-intact 01/07/2025 10:48 AM    POCSPGRV 1.020 01/07/2025 10:48 AM    POCKETONES trace 01/07/2025 10:48 AM    POCBILIRUBIN neg 01/07/2025 10:48 AM    POCGLUCUA neg 01/07/2025 10:48 AM      CBC   Lab Results   Component Value Date/Time    WBC 10.0 11/19/2024 0825    RBC 5.36 11/19/2024 0825    HEMOGLOBIN 16.0 11/19/2024 0825    HEMATOCRIT 47.0 11/19/2024 0825    MCV 87.7 11/19/2024 0825    MCH 29.9 11/19/2024 0825    [facility] 34.0  11/19/2024 0825    RDW 41.7 11/19/2024 0825    MPV 9.6 11/19/2024 0825    LYMPHOCYTES 22.40 11/19/2024 0825    LYMPHS 2.24 11/19/2024 0825    MONOCYTES 10.20 11/19/2024 0825    MONOS 1.02 (H) 11/19/2024 0825    EOSINOPHILS 2.30 11/19/2024 0825    EOS 0.23 11/19/2024 0825    BASOPHILS 0.80 11/19/2024 0825    BASO 0.08 11/19/2024 0825    NRBC 0.00 11/19/2024 0825       CMP   Lab Results   Component Value Date/Time    SODIUM 137 11/19/2024 0825    POTASSIUM 4.3 11/19/2024 0825    CHLORIDE 99 11/19/2024 0825    CO2 22 11/19/2024 0825    ANION 16.0 11/19/2024 0825    GLUCOSE 124 (H) 11/19/2024 0825    BUN 16 11/19/2024 0825    CREATININE 0.95 11/19/2024 0825    GFRCKD 90 11/19/2024 0825    CALCIUM 9.4 11/19/2024 0825    CORRCALC 9.1 11/19/2024 0825    ASTSGOT 15 11/19/2024 0825    ALTSGPT 18 11/19/2024 0825    ALKPHOSPHAT 86 11/19/2024 0825    TBILIRUBIN 0.5 11/19/2024 0825    ALBUMIN 4.4 11/19/2024 0825    TOTPROTEIN 8.1 11/19/2024 0825    GLOBULIN 3.7 (H) 11/19/2024 0825    AGRATIO 1.2 11/19/2024 0825       Imaging:   US-ABDOMEN COMPLETE SURVEY  Order: [id number]   Status: Final result       Visible to patient: Yes (seen)       Next appt: 01/23/2025 at 11:00 AM in Urology (David Marvin M.D.)       Dx: Left upper quadrant abdominal pain    0 Result Notes  Details    Reading Physician Reading Date Result Priority   Vonda Pandya M.D.  [phone] 5/10/2024      Narrative & Impression     5/10/2024 10:58 AM     HISTORY/REASON FOR EXAM:  Left upper quadrant pain        TECHNIQUE/EXAM DESCRIPTION AND NUMBER OF VIEWS:  Complete abdomen survey.     COMPARISON: 2/22/2007     FINDINGS:  The liver is normal in contour with increased echogenicity. There is no evidence of solid mass lesion. The liver measures 21.45 cm.     The gallbladder is normal. There is no evidence of cholelithiasis. The gallbladder wall thickness measures 0.23 cm.  There is no pericholecystic fluid.     The common duct measures 0.24 cm.     The  visualized pancreas is unremarkable.  The visualized aorta is normal in caliber.     Intrahepatic IVC is patent.     The portal vein is patent with hepatopetal flow. The MPV measures 1.42 cm cm.     The right kidney measures 12.76 cm.  The left kidney measures 12.24 cm.  There is no hydronephrosis.     The spleen measures 14.03 cm maximally.     The bladder is not fully distended.     There is no ascites.     IMPRESSION:     1.  The liver is enlarged and heterogenously echogenic.  Differential diagnosis includes most likely hepatic steatosis versus other diffuse hepatocellular disease.  2.  There is mild splenomegaly.             Assessment: 64 y.o. male with a history of recurrent UTI's, including at least one episode of severe infection requiring hospitalization, and now two UTI's over the last 3 months. He denies any other form of recurrent infections (respiratory, skin, GI, etc). He has diabetes but this appears to be well controlled recently (last hemoglobin A1c was 5.6% in October 2024) and he does not take an SGLT-2 inhibitor like Jardiance and urinalyses have not been positive for glucose.     Given the recurrence and history of severity of his infections, I recommended further workup to evaluate for any modifiable risk factors for UTI including stones, abscesses, urinary obstruction, diverticula, etc.       Plan:    -POCT urinalysis positive today; will send urine culture  -Empiric Augmentin x 7 days; will change if needed based on culture  -CT abd/pelvis w/wo contrast  -Office cystoscopy; will schedule after CT  -Will further discuss treatment for urge incontinence once infection has cleared and we better understand his lower urinary tract anatomy after cystoscopy    David Marvin MD

## 2025-01-08 ENCOUNTER — HOSPITAL ENCOUNTER (OUTPATIENT)
Dept: LAB | Facility: MEDICAL CENTER | Age: 65
End: 2025-01-08
Attending: UROLOGY
Payer: COMMERCIAL

## 2025-01-08 DIAGNOSIS — N39.0 COMPLICATED UTI (URINARY TRACT INFECTION): ICD-10-CM

## 2025-01-08 LAB
ANION GAP SERPL CALC-SCNC: 10 MMOL/L (ref 7–16)
BUN SERPL-MCNC: 13 MG/DL (ref 8–22)
CALCIUM SERPL-MCNC: 9.7 MG/DL (ref 8.5–10.5)
CHLORIDE SERPL-SCNC: 101 MMOL/L (ref 96–112)
CO2 SERPL-SCNC: 29 MMOL/L (ref 20–33)
CREAT SERPL-MCNC: 0.83 MG/DL (ref 0.5–1.4)
GFR SERPLBLD CREATININE-BSD FMLA CKD-EPI: 98 ML/MIN/1.73 M 2
GLUCOSE SERPL-MCNC: 105 MG/DL (ref 65–99)
POTASSIUM SERPL-SCNC: 4 MMOL/L (ref 3.6–5.5)
SODIUM SERPL-SCNC: 140 MMOL/L (ref 135–145)

## 2025-01-08 PROCEDURE — 36415 COLL VENOUS BLD VENIPUNCTURE: CPT

## 2025-01-08 PROCEDURE — 80048 BASIC METABOLIC PNL TOTAL CA: CPT

## 2025-01-10 LAB
BACTERIA UR CULT: ABNORMAL
BACTERIA UR CULT: ABNORMAL
SIGNIFICANT IND 70042: ABNORMAL
SITE SITE: ABNORMAL
SOURCE SOURCE: ABNORMAL

## 2025-01-16 DIAGNOSIS — E11.9 TYPE 2 DIABETES MELLITUS WITHOUT COMPLICATION, WITHOUT LONG-TERM CURRENT USE OF INSULIN (HCC): ICD-10-CM

## 2025-01-20 NOTE — TELEPHONE ENCOUNTER
Received request via: Pharmacy    Was the patient seen in the last year in this department? Yes    Does the patient have an active prescription (recently filled or refills available) for medication(s) requested? No    Pharmacy Name:   [facility] - [address] [address]  Phone: [phone] Fax: [phone]        Does the patient have MCFP Plus and need 100-day supply? (This applies to ALL medications) Patient does not have SCP

## 2025-01-21 ENCOUNTER — HOSPITAL ENCOUNTER (OUTPATIENT)
Dept: RADIOLOGY | Facility: MEDICAL CENTER | Age: 65
End: 2025-01-21
Attending: UROLOGY
Payer: COMMERCIAL

## 2025-01-21 DIAGNOSIS — N39.0 COMPLICATED UTI (URINARY TRACT INFECTION): ICD-10-CM

## 2025-01-21 PROCEDURE — 74177 CT ABD & PELVIS W/CONTRAST: CPT

## 2025-01-21 PROCEDURE — 700117 HCHG RX CONTRAST REV CODE 255: Performed by: UROLOGY

## 2025-01-21 RX ADMIN — IOHEXOL 100 ML: 350 INJECTION, SOLUTION INTRAVENOUS at 13:23

## 2025-01-23 ENCOUNTER — OFFICE VISIT (OUTPATIENT)
Dept: UROLOGY | Facility: MEDICAL CENTER | Age: 65
End: 2025-01-23
Payer: COMMERCIAL

## 2025-01-23 DIAGNOSIS — N39.0 RECURRENT UTI: ICD-10-CM

## 2025-01-23 DIAGNOSIS — N39.41 URGE INCONTINENCE: ICD-10-CM

## 2025-01-23 DIAGNOSIS — N32.81 OVERACTIVE BLADDER: ICD-10-CM

## 2025-01-23 LAB
APPEARANCE UR: NORMAL
BILIRUB UR STRIP-MCNC: NORMAL MG/DL
COLOR UR AUTO: NORMAL
GLUCOSE UR STRIP.AUTO-MCNC: NORMAL MG/DL
KETONES UR STRIP.AUTO-MCNC: NORMAL MG/DL
LEUKOCYTE ESTERASE UR QL STRIP.AUTO: NORMAL
NITRITE UR QL STRIP.AUTO: POSITIVE
PH UR STRIP.AUTO: 7.5 [PH] (ref 5–8)
PROT UR QL STRIP: NORMAL MG/DL
RBC UR QL AUTO: NORMAL
SP GR UR STRIP.AUTO: 1.02
UROBILINOGEN UR STRIP-MCNC: 2 MG/DL

## 2025-01-23 PROCEDURE — 52000 CYSTOURETHROSCOPY: CPT | Performed by: UROLOGY

## 2025-01-23 PROCEDURE — 81002 URINALYSIS NONAUTO W/O SCOPE: CPT | Performed by: UROLOGY

## 2025-01-23 NOTE — PROGRESS NOTES
Flexible Cystoscopy Report    Patient name: Ramirez Harmon    Age: 64 y.o.    Procedure: Flexible cystourethroscopy    Pre-procedure diagnosis: Recurrent UTI, urge incontinence    Post-procedure diagnosis: Normal lower urinary tract    Procedure indications: Ramirez Harmon is a 64 y.o. male with a history of at least three known urinary tract infections, including recent treatment for an E coli UTI, here for evaluation of the lower urinary tract. Recent CTAP demonstrated no renal or bladder stones, and no renal or prostatic abscesses.     Procedure details: After providing a urine sample for a urinalysis to rule out active urinary tract infection, the patient was brought to the procedure room and placed in supine position. The external genitalia were then prepped and draped in usual sterile fashion. Lidocaine jelly was administered via the urethral meatus and held in place for approximately two minutes for local anesthesia.    After a procedure time-out to confirm the proper patient, procedure, consent, and availability of all necessary equipment, the case began by inserting a 16-English flexible cystoscope via the urethral meatus. Findings included:    Anterior urethra: normal  Posterior urethra: normal membranous urethra; prostatic urethra with moderate lateral lobe hypertrophy; normal bladder neck  Bladder:  Bladder mucosa normal without masses, diverticuli, or trabeculations. No stones or other foreign bodies noted. Bilateral ureteral orifices were identified and found to have efflux of clear yellow urine.     The cystoscope was then carefully withdrawn from the bladder and urethra. The patient was cleaned and dried and allowed to void.     Assessment/Plan:  -Normal urethra and bladder; unclear etiology of recurrent infections though we did discuss importance of penile hygiene which can be somewhat complicated by buried penis  -Trial of vibegron 75 mg PO daily for urge inconitnence  -RTC in 3 months for  assessment of UUI response    David Marvin MD

## 2025-01-24 ENCOUNTER — OFFICE VISIT (OUTPATIENT)
Dept: MEDICAL GROUP | Facility: PHYSICIAN GROUP | Age: 65
End: 2025-01-24
Payer: COMMERCIAL

## 2025-01-24 VITALS
WEIGHT: 279.3 LBS | HEART RATE: 81 BPM | OXYGEN SATURATION: 99 % | HEIGHT: 72 IN | BODY MASS INDEX: 37.83 KG/M2 | RESPIRATION RATE: 14 BRPM

## 2025-01-24 DIAGNOSIS — E11.9 TYPE 2 DIABETES MELLITUS WITHOUT COMPLICATION, WITHOUT LONG-TERM CURRENT USE OF INSULIN (HCC): ICD-10-CM

## 2025-01-24 LAB
HBA1C MFR BLD: 5.4 % (ref ?–5.8)
POCT INT CON NEG: NEGATIVE
POCT INT CON POS: POSITIVE

## 2025-01-24 PROCEDURE — 99402 PREV MED CNSL INDIV APPRX 30: CPT | Performed by: PHARMACIST

## 2025-01-24 PROCEDURE — 83036 HEMOGLOBIN GLYCOSYLATED A1C: CPT | Performed by: FAMILY MEDICINE

## 2025-01-24 ASSESSMENT — FIBROSIS 4 INDEX: FIB4 SCORE: 0.67

## 2025-01-24 NOTE — PROGRESS NOTES
Patient Consult Note - Follow Up Visit  Primary care physician: Rosalie Colby M.D.    Reason for consult: Management of Uncontrolled Type 2 Diabetes    Time IN:  2:41pm  Time OUT:  2:59pm    HPI:  Ramirez Harmon is a 64 y.o. old patient who comes in today for evaluation of above stated problem.    Most Recent HbA1c:   Lab Results   Component Value Date/Time    HBA1C 5.4 01/24/2025 02:44 PM      Reliable and Exact Care Diabetes Score    Displays the Diabetes REC Score.  A patient gets 1 point for each measure for a maximum of 7 points   1  Measures Not Met      Factor Value   REC DM SCORE - Microalbumin : Creatinine Ratio has been performed in the last year Not Met   - Last Microalbumin : Creatinine Ratio 105 (1/11/2024)             Current Diabetes Regimen:  GLP-1 Agent: Mounjaro 10mg SQ once weekly  Metformin 1000mg QD - decrease dose on own accord     Previously attempted medications:  None    Discussed FBG goal of , 2hrPP <180, and A1c <7.0%.  Before Breakfast: no current testing  Before Lunch:  Before Dinner:  Before Bedtime:  Other times:  Hypoglycemia:  None    Lifestyle:  Continues to note good appetite suppression and early satiety with most meals.  No change to types of foods incorporated to nutrition plan.  No exercise.    Previous visit:  Current Exercise - No exercise currently   Exercise Goal - Encouraged pt to start walking his dog more often to get in 150 minutes of exercise     Dietary -      Pt reports eating:  Breakfast - Breakfast sandwich (frozen) and coffee with doughnut   Lunch - Pizza, or burgers for lunch and dinner  Snack - Doughnuts and other simple sugars  Zero sugar Gatorades and water, occasionally some juice    Preventative Management  BP regimen (ACEi/ARB): Lisinopril 20 mg QD  BP <140/90: Yes  Statin: atorvastatin (Lipitor) 40 mg daily  LDL <100: No  Lab Results   Component Value Date/Time    CHOLSTRLTOT 144 11/19/2024 08:25 AM    LDL 82 11/19/2024 08:25 AM    HDL 39 (A)  11/19/2024 08:25 AM    TRIGLYCERIDE 115 11/19/2024 08:25 AM       Last Microalbumin/Cr:  Lab Results   Component Value Date/Time    MALBCRT 105 (H) 01/11/2024 07:53 AM    MICROALBUR 21.8 01/11/2024 07:53 AM     Last A1c:  Lab Results   Component Value Date/Time    HBA1C 5.4 01/24/2025 02:44 PM      Monofilament exam: up to date, last done 6/2024      REC DM Score: 1  Care gaps addressed:   Lipid panel is due: Lipid panel ordered  LDL is above 100: Optimized lipid medications/management  Care gaps updated in Health Maintenance    ROS:  Constitutional: No weight loss  Cardiac: No palpitations or racing heart  Resp: No shortness of breath  Neuro: No numbness or tinging in feet  Endo: No heat or cold intolerance, no polyuria or polydipsia  All other systems were reviewed and were negative.    Past Medical History:  Patient Active Problem List    Diagnosis Date Noted    Weight gain 06/26/2024    Anemia 06/26/2024    Other specified hypothyroidism 09/13/2023    Vitamin D deficiency 09/13/2023    Morbid obesity with BMI of 40.0-44.9, adult ([facility]) 02/28/2022    GERD (gastroesophageal reflux disease) 07/31/2018    Anxiety 07/31/2018    Type 2 diabetes mellitus with microalbuminuria, without long-term current use of insulin ([facility]) 12/04/2012    Dyslipidemia 12/04/2012    Essential hypertension, benign 12/04/2012    Memory dysfunction 12/04/2012    Sleep apnea 12/04/2012    Hx of Botulism 01/01/2007       Past Surgical History:  Past Surgical History:   Procedure Laterality Date    TRACHEOSTOMY         Allergies:  Patient has no known allergies.    Social History:  Social History     Socioeconomic History    Marital status:      Spouse name: Not on file    Number of children: Not on file    Years of education: Not on file    Highest education level: Not on file   Occupational History    Not on file   Tobacco Use    Smoking status: Former     Current packs/day: 0.00     Types: Cigarettes     Quit date: 12/4/1980     Years  since quittin.1    Smokeless tobacco: Former     Types: Chew     Quit date: 1980    Tobacco comments:     NOT REALLY SURE WHAT YEAR HE QUIT SMOKING & CHEW   Vaping Use    Vaping status: Never Used   Substance and Sexual Activity    Alcohol use: No    Drug use: Yes     Types: Marijuana     Comment: now and then    Sexual activity: Not on file   Other Topics Concern    Not on file   Social History Narrative    Not on file     Social Drivers of Health     Financial Resource Strain: Not on file   Food Insecurity: Not on file   Transportation Needs: Not on file   Physical Activity: Not on file   Stress: Not on file   Social Connections: Not on file   Intimate Partner Violence: Not on file   Housing Stability: Not on file       Family History:  Family History   Problem Relation Age of Onset    Stroke Mother     Heart Disease Father     Heart Disease Maternal Grandmother     Heart Disease Maternal Grandfather     Heart Disease Paternal Grandmother     Heart Disease Paternal Grandfather     Cancer Neg Hx        Medications:    Current Outpatient Medications:     amoxicillin-clavulanate (AUGMENTIN) 875-125 MG Tab, Take 1 Tablet by mouth 2 times a day for 7 days., Disp: 14 Tablet, Rfl: 0    vibegron (GEMTESA) 75 MG tablet, Take 1 Tablet by mouth every day., Disp: 30 Tablet, Rfl: 11    levothyroxine (SYNTHROID) 25 MCG Tab, TAKE 1 TABLET EVERY MORNING ON AN EMPTY STOMACH, Disp: 90 Tablet, Rfl: 3    chlorthalidone (HYGROTON) 25 MG Tab, Take 1 Tablet by mouth every day., Disp: 90 Tablet, Rfl: 1    esomeprazole (NEXIUM) 40 MG delayed-release capsule, Take 1 Capsule by mouth every morning before breakfast., Disp: 90 Capsule, Rfl: 0    fluoxetine (PROZAC) 40 MG capsule, Take 1 Capsule by mouth every day., Disp: 90 Capsule, Rfl: 3    Tirzepatide (MOUNJARO) 10 MG/0.5ML Solution Pen-injector, Inject 1 Pen under the skin every 7 days., Disp: 6 mL, Rfl: 0    atorvastatin (LIPITOR) 40 MG Tab, Take 1 Tablet by mouth every  "evening., Disp: 90 Tablet, Rfl: 3    Blood Glucose Monitoring Suppl (ONE TOUCH ULTRA 2) w/Device Kit, Use to test blood glucose one to two times daily, Disp: 1 Kit, Rfl: 0    glucose blood (ONETOUCH ULTRA) strip, Use to test blood glucose one to two times daily, Disp: 100 Strip, Rfl: 4    ONE TOUCH ULTRASOFT LANCETS Misc, Use to test blood glucose one to two times daily, Disp: 100 Each, Rfl: 4    lisinopril (PRINIVIL) 10 MG Tab, Take 2 Tablets by mouth every day., Disp: 90 Tablet, Rfl: 3    Labs: Reviewed    Physical Examination:  Vital signs: Resp 14   Ht 1.829 m (6' 0.01\")   Wt (!) 127 kg (279 lb 4.8 oz)   BMI 37.87 kg/m²  Body mass index is 37.87 kg/m².  General: No apparent distress, cooperative  Eyes: No scleral icterus or discharge  ENMT: Normal on external inspection of nose, lips, normal thyroid exam  Neck: No abnormal masses on inspection  Resp: Normal effort, clear to auscultation bilaterally   CVS: Regular rate and rhythm, S1 S2 normal, no murmur   Extremities: No edema  Abdomen: abdominal obesity present  Neuro: Alert and oriented  Skin: No rash  Psych: Normal mood and affect, intact memory and able to make informed decisions    Assessment and Plan:    Basic physiology of DMII was explained to patient as well as microvascular/macrovascular complications. The importance of increasing physical activity to improve diabetes control was discussed with the patient. Patient was also educated on changing diet and making better choices to help control blood sugar.    Patient tolerating current dosing of Mounjaro.  Decreased metformin dose on own accord.  No home FBG readings to assess today.  A1c drawn today, still well within goal at 5.4% (previously 5.6%).    No appreciable improvements or worsening to nutrition habits.  Continues to note appetite suppression and early satiety with most meals.  No consistent exercise in place.    Patient prefers to continue Mounjaro at current dosing, but is open to further " dose escalation should weight loss not continue to his liking.  Has lost ~60 lbs at this point.    STOP metformin.  Continue all other medications for now.  Stressed importance of low carb/high protein nutrition focus, even with decrease in appetite.  Would highly benefit from some form of consistent exercise.    Follow Up:  Three months for A1c    Thank you for allowing me to participate in the care of this patient.    Cain Martinez, NasreenD, [facility]sonCP  01/24/25    CC:   Rosalie Colby M.D.

## 2025-01-28 RX ORDER — TIRZEPATIDE 10 MG/.5ML
10 INJECTION, SOLUTION SUBCUTANEOUS
Qty: 6 ML | Refills: 0 | Status: SHIPPED | OUTPATIENT
Start: 2025-01-28

## 2025-01-29 NOTE — TELEPHONE ENCOUNTER
Received request via: Patient    Was the patient seen in the last year in this department? Yes    Does the patient have an active prescription (recently filled or refills available) for medication(s) requested? No    Pharmacy Name: [facility] [place] - [address]     Does the patient have alf Plus and need 100-day supply? (This applies to ALL medications) Patient does not have SCP

## 2025-03-31 DIAGNOSIS — N39.41 URGE INCONTINENCE: ICD-10-CM

## 2025-03-31 DIAGNOSIS — N32.81 OVERACTIVE BLADDER: ICD-10-CM

## 2025-03-31 NOTE — TELEPHONE ENCOUNTER
Received request via: Patient    Was the patient seen in the last year in this department? Yes    Does the patient have an active prescription (recently filled or refills available) for medication(s) requested? No    Pharmacy Name: [facility] - [address]     Does the patient have custodial Plus and need 100-day supply? (This applies to ALL medications) Patient does not have SCP

## 2025-04-13 DIAGNOSIS — K21.9 GASTROESOPHAGEAL REFLUX DISEASE WITHOUT ESOPHAGITIS: ICD-10-CM

## 2025-04-14 RX ORDER — ESOMEPRAZOLE MAGNESIUM 40 MG/1
40 CAPSULE, DELAYED RELEASE ORAL
Qty: 90 CAPSULE | Refills: 1 | Status: SHIPPED | OUTPATIENT
Start: 2025-04-14

## 2025-04-25 ENCOUNTER — OFFICE VISIT (OUTPATIENT)
Dept: MEDICAL GROUP | Facility: PHYSICIAN GROUP | Age: 65
End: 2025-04-25
Payer: COMMERCIAL

## 2025-04-25 VITALS
HEIGHT: 72 IN | BODY MASS INDEX: 36.06 KG/M2 | OXYGEN SATURATION: 98 % | HEART RATE: 88 BPM | WEIGHT: 266.2 LBS | RESPIRATION RATE: 14 BRPM

## 2025-04-25 DIAGNOSIS — E11.9 TYPE 2 DIABETES MELLITUS WITHOUT COMPLICATION, WITHOUT LONG-TERM CURRENT USE OF INSULIN (HCC): ICD-10-CM

## 2025-04-25 LAB
HBA1C MFR BLD: 5.5 % (ref ?–5.8)
POCT INT CON NEG: NEGATIVE
POCT INT CON POS: POSITIVE

## 2025-04-25 ASSESSMENT — FIBROSIS 4 INDEX: FIB4 SCORE: 0.68

## 2025-04-25 NOTE — PROGRESS NOTES
Patient Consult Note - Follow Up Visit  Primary care physician: Rosalie Colby M.D.    Reason for consult: Management of Uncontrolled Type 2 Diabetes    Time IN:  2:29pm  Time OUT:  2:48pm    HPI:  Ramirez Harmon is a 64 y.o. old patient who comes in today for evaluation of above stated problem.    Most Recent HbA1c:   Lab Results   Component Value Date/Time    HBA1C 5.5 04/25/2025 02:35 PM      Reliable and Exact Care Diabetes Score    Displays the Diabetes REC Score.  A patient gets 1 point for each measure for a maximum of 7 points   1  Measures Not Met      Factor Value   REC DM SCORE - Microalbumin : Creatinine Ratio has been performed in the last year Not Met   - Last Microalbumin : Creatinine Ratio 105 (1/11/2024)             Current Diabetes Regimen:  GLP-1 Agent: Mounjaro 10mg SQ once weekly  Metformin 1000mg QD - decrease dose on own accord     Previously attempted medications:  None    Discussed FBG goal of , 2hrPP <180, and A1c <7.0%.  Before Breakfast: no current testing  Before Lunch:  Before Dinner:  Before Bedtime:  Other times:  Hypoglycemia:  None    Lifestyle:  Continues to note good appetite suppression and early satiety with most meals.  No change to types of foods incorporated to nutrition plan.  No exercise.    Previous visit:  Current Exercise - No exercise currently   Exercise Goal - Encouraged pt to start walking his dog more often to get in 150 minutes of exercise     Dietary -      Pt reports eating:  Breakfast - Breakfast sandwich (frozen) and coffee with doughnut   Lunch - Pizza, or burgers for lunch and dinner  Snack - Doughnuts and other simple sugars  Zero sugar Gatorades and water, occasionally some juice    Preventative Management  BP regimen (ACEi/ARB): Lisinopril 20 mg QD  BP <140/90: Yes  Statin: atorvastatin (Lipitor) 40 mg daily  LDL <100: No  Lab Results   Component Value Date/Time    CHOLSTRLTOT 144 11/19/2024 08:25 AM    LDL 82 11/19/2024 08:25 AM    HDL 39 (A)  2024 08:25 AM    TRIGLYCERIDE 115 2024 08:25 AM       Last Microalbumin/Cr:  Lab Results   Component Value Date/Time    MALBCRT 105 (H) 2024 07:53 AM    MICROALBUR 21.8 2024 07:53 AM     Last A1c:  Lab Results   Component Value Date/Time    HBA1C 5.5 2025 02:35 PM      Monofilament exam: up to date, last done 2024      REC DM Score: 1  Care gaps addressed:   [facility] overdue - order and has labs schedule in May 2025  Care gaps updated in Health Maintenance    ROS:  Constitutional: No weight loss  Cardiac: No palpitations or racing heart  Resp: No shortness of breath  Neuro: No numbness or tinging in feet  Endo: No heat or cold intolerance, no polyuria or polydipsia  All other systems were reviewed and were negative.    Past Medical History:  Patient Active Problem List    Diagnosis Date Noted    Weight gain 2024    Anemia 2024    Other specified hypothyroidism 2023    Vitamin D deficiency 2023    Morbid obesity with BMI of 40.0-44.9, adult ([facility]) 2022    GERD (gastroesophageal reflux disease) 2018    Anxiety 2018    Type 2 diabetes mellitus with microalbuminuria, without long-term current use of insulin ([facility]) 2012    Dyslipidemia 2012    Essential hypertension, benign 2012    Memory dysfunction 2012    Sleep apnea 2012    Hx of Botulism 2007       Past Surgical History:  Past Surgical History:   Procedure Laterality Date    TRACHEOSTOMY         Allergies:  Patient has no known allergies.    Social History:  Social History     Socioeconomic History    Marital status:      Spouse name: Not on file    Number of children: Not on file    Years of education: Not on file    Highest education level: Not on file   Occupational History    Not on file   Tobacco Use    Smoking status: Former     Current packs/day: 0.00     Types: Cigarettes     Quit date: 1980     Years since quittin.4    Smokeless tobacco:  Former     Types: Chew     Quit date: 12/4/1980    Tobacco comments:     NOT REALLY SURE WHAT YEAR HE QUIT SMOKING & CHEW   Vaping Use    Vaping status: Never Used   Substance and Sexual Activity    Alcohol use: No    Drug use: Yes     Types: Marijuana     Comment: now and then    Sexual activity: Not on file   Other Topics Concern    Not on file   Social History Narrative    Not on file     Social Drivers of Health     Financial Resource Strain: Not on file   Food Insecurity: Not on file   Transportation Needs: Not on file   Physical Activity: Not on file   Stress: Not on file   Social Connections: Not on file   Intimate Partner Violence: Not on file   Housing Stability: Not on file       Family History:  Family History   Problem Relation Age of Onset    Stroke Mother     Heart Disease Father     Heart Disease Maternal Grandmother     Heart Disease Maternal Grandfather     Heart Disease Paternal Grandmother     Heart Disease Paternal Grandfather     Cancer Neg Hx        Medications:    Current Outpatient Medications:     esomeprazole (NEXIUM) 40 MG delayed-release capsule, TAKE 1 CAPSULE EVERY MORNING BEFORE BREAKFAST, Disp: 90 Capsule, Rfl: 1    vibegron (GEMTESA) 75 MG tablet, Take 1 Tablet by mouth every day., Disp: 30 Tablet, Rfl: 11    Tirzepatide (MOUNJARO) 10 MG/0.5ML Solution Auto-injector, Inject 10 mg under the skin every 7 days., Disp: 6 mL, Rfl: 0    levothyroxine (SYNTHROID) 25 MCG Tab, TAKE 1 TABLET EVERY MORNING ON AN EMPTY STOMACH, Disp: 90 Tablet, Rfl: 3    chlorthalidone (HYGROTON) 25 MG Tab, Take 1 Tablet by mouth every day., Disp: 90 Tablet, Rfl: 1    fluoxetine (PROZAC) 40 MG capsule, Take 1 Capsule by mouth every day., Disp: 90 Capsule, Rfl: 3    atorvastatin (LIPITOR) 40 MG Tab, Take 1 Tablet by mouth every evening., Disp: 90 Tablet, Rfl: 3    Blood Glucose Monitoring Suppl (ONE TOUCH ULTRA 2) w/Device Kit, Use to test blood glucose one to two times daily, Disp: 1 Kit, Rfl: 0    glucose blood  "(ONETOUCH ULTRA) strip, Use to test blood glucose one to two times daily, Disp: 100 Strip, Rfl: 4    ONE TOUCH ULTRASOFT LANCETS Misc, Use to test blood glucose one to two times daily, Disp: 100 Each, Rfl: 4    lisinopril (PRINIVIL) 10 MG Tab, Take 2 Tablets by mouth every day., Disp: 90 Tablet, Rfl: 3    Labs: Reviewed    Physical Examination:  Vital signs: Pulse 88   Resp 14   Ht 1.829 m (6' 0.01\")   Wt 121 kg (266 lb 3.2 oz)   SpO2 98%   BMI 36.10 kg/m²  Body mass index is 36.1 kg/m².  General: No apparent distress, cooperative  Eyes: No scleral icterus or discharge  ENMT: Normal on external inspection of nose, lips, normal thyroid exam  Neck: No abnormal masses on inspection  Resp: Normal effort, clear to auscultation bilaterally   CVS: Regular rate and rhythm, S1 S2 normal, no murmur   Extremities: No edema  Abdomen: abdominal obesity present  Neuro: Alert and oriented  Skin: No rash  Psych: Normal mood and affect, intact memory and able to make informed decisions    Assessment and Plan:    Basic physiology of DMII was explained to patient as well as microvascular/macrovascular complications. The importance of increasing physical activity to improve diabetes control was discussed with the patient. Patient was also educated on changing diet and making better choices to help control blood sugar.    Patient tolerating current dosing of Mounjaro.  No home FBG readings to assess today.  A1c drawn today, still well within goal at 5.5% (previously 5.4% 1/2025).    No appreciable improvements or worsening to nutrition habits.  Continues to note appetite suppression and early satiety with most meals.  No consistent exercise in place.    Patient prefers to continue Mounjaro at current dosing, but is open to further dose escalation should weight loss not continue to his liking.  Has continues to lose weight, now down ~80lbs from starting point.    Will continue with all current medications for now.  Stressed " importance of low carb/high protein nutrition focus, even with decrease in appetite.  Would highly benefit from some form of consistent exercise.    Follow Up:  Defer back to PCP    Thank you for allowing me to participate in the care of this patient.    Cain Martinez, PharmD, BCACP  01/24/25    CC:   Rosalie Colby M.D.

## 2025-05-08 ENCOUNTER — OFFICE VISIT (OUTPATIENT)
Dept: UROLOGY | Facility: MEDICAL CENTER | Age: 65
End: 2025-05-08
Payer: COMMERCIAL

## 2025-05-08 DIAGNOSIS — Z87.440 HISTORY OF RECURRENT UTI (URINARY TRACT INFECTION): ICD-10-CM

## 2025-05-08 DIAGNOSIS — N32.81 OVERACTIVE BLADDER: ICD-10-CM

## 2025-05-08 DIAGNOSIS — N39.41 URGE INCONTINENCE: ICD-10-CM

## 2025-05-08 PROCEDURE — 99213 OFFICE O/P EST LOW 20 MIN: CPT | Performed by: UROLOGY

## 2025-05-08 NOTE — PROGRESS NOTES
Chief Complaint: history of recurrent UTI and overactive bladder    HPI: Ramirez Harmon is a 65 y.o. male with a history of recurrent urinary tract infections and overactive bladder, here for follow up. Last seen 4 months ago. He's doing well, and has had no further infections since our last visit. He also notes improvement in urinary frequency, urgency, and fewer episodes of urge incontinence after starting vibegron. No noted adverse effects.     Past Medical History:  Past Medical History:   Diagnosis Date    Diabetes     Hyperlipidemia     Hypertension        Past Surgical History:  Past Surgical History:   Procedure Laterality Date    TRACHEOSTOMY         Family History:  Family History   Problem Relation Age of Onset    Stroke Mother     Heart Disease Father     Heart Disease Maternal Grandmother     Heart Disease Maternal Grandfather     Heart Disease Paternal Grandmother     Heart Disease Paternal Grandfather     Cancer Neg Hx        Social History:  Social History     Socioeconomic History    Marital status:      Spouse name: Not on file    Number of children: Not on file    Years of education: Not on file    Highest education level: Not on file   Occupational History    Not on file   Tobacco Use    Smoking status: Former     Current packs/day: 0.00     Types: Cigarettes     Quit date: 1980     Years since quittin.4    Smokeless tobacco: Former     Types: Chew     Quit date: 1980    Tobacco comments:     NOT REALLY SURE WHAT YEAR HE QUIT SMOKING & CHEW   Vaping Use    Vaping status: Never Used   Substance and Sexual Activity    Alcohol use: No    Drug use: Yes     Types: Marijuana     Comment: now and then    Sexual activity: Not on file   Other Topics Concern    Not on file   Social History Narrative    Not on file     Social Drivers of Health     Financial Resource Strain: Not on file   Food Insecurity: Not on file   Transportation Needs: Not on file   Physical Activity: Not on file    Stress: Not on file   Social Connections: Not on file   Intimate Partner Violence: Not on file   Housing Stability: Not on file       Medications:  Current Outpatient Medications   Medication Sig Dispense Refill    esomeprazole (NEXIUM) 40 MG delayed-release capsule TAKE 1 CAPSULE EVERY MORNING BEFORE BREAKFAST 90 Capsule 1    vibegron (GEMTESA) 75 MG tablet Take 1 Tablet by mouth every day. 30 Tablet 11    Tirzepatide (MOUNJARO) 10 MG/0.5ML Solution Auto-injector Inject 10 mg under the skin every 7 days. 6 mL 0    levothyroxine (SYNTHROID) 25 MCG Tab TAKE 1 TABLET EVERY MORNING ON AN EMPTY STOMACH 90 Tablet 3    chlorthalidone (HYGROTON) 25 MG Tab Take 1 Tablet by mouth every day. 90 Tablet 1    fluoxetine (PROZAC) 40 MG capsule Take 1 Capsule by mouth every day. 90 Capsule 3    atorvastatin (LIPITOR) 40 MG Tab Take 1 Tablet by mouth every evening. 90 Tablet 3    Blood Glucose Monitoring Suppl (ONE TOUCH ULTRA 2) w/Device Kit Use to test blood glucose one to two times daily 1 Kit 0    glucose blood (ONETOUCH ULTRA) strip Use to test blood glucose one to two times daily 100 Strip 4    ONE TOUCH ULTRASOFT LANCETS Misc Use to test blood glucose one to two times daily 100 Each 4    lisinopril (PRINIVIL) 10 MG Tab Take 2 Tablets by mouth every day. 90 Tablet 3     No current facility-administered medications for this visit.       Allergies:  No Known Allergies    Review of Systems:  Constitutional: Negative for fever, chills and malaise/fatigue.   HENT: Negative for congestion.    Eyes: Negative for pain.   Respiratory: Negative for cough and shortness of breath.    Cardiovascular: Negative for leg swelling.   Gastrointestinal: Negative for nausea, vomiting, abdominal pain and diarrhea.   Genitourinary: Negative for dysuria and hematuria.   Skin: Negative for rash.   Neurological: Negative for dizziness, focal weakness and headaches.   Endo/Heme/Allergies: Does not bruise/bleed easily.   Psychiatric/Behavioral:  Negative for depression.  The patient is not nervous/anxious.        Physical Exam:  There were no vitals filed for this visit.    GENERAL: well appearing, well nourished, NAD  RESP: respiratory effort normal  ABDOMEN: soft, nontender, nondistended, no masses or organomegaly  SKIN/LYMPH: normal coloration and turgor, no suspicious skin lesions noted  NEURO/PSYCH: alert, oriented, normal speech, no focal findings or movement disorder noted  EXTREMITIES: no pedal edema noted    Data Review:    Labs:  POCT UA   Lab Results   Component Value Date/Time    POCCOLOR dark yellow 01/23/2025 11:02 AM    POCAPPEAR cloudy 01/23/2025 11:02 AM    POCLEUKEST trace 01/23/2025 11:02 AM    POCNITRITE positive 01/23/2025 11:02 AM    POCUROBILIGE 2.0 01/23/2025 11:02 AM    POCPROTEIN trace 01/23/2025 11:02 AM    POCURPH 7.5 01/23/2025 11:02 AM    POCBLOOD neg 01/23/2025 11:02 AM    POCSPGRV 1.020 01/23/2025 11:02 AM    POCKETONES neg 01/23/2025 11:02 AM    POCBILIRUBIN neg 01/23/2025 11:02 AM    POCGLUCUA neg 01/23/2025 11:02 AM      CBC   Lab Results   Component Value Date/Time    WBC 10.0 11/19/2024 0825    RBC 5.36 11/19/2024 0825    HEMOGLOBIN 16.0 11/19/2024 0825    HEMATOCRIT 47.0 11/19/2024 0825    MCV 87.7 11/19/2024 0825    MCH 29.9 11/19/2024 0825    MCHC 34.0 11/19/2024 0825    RDW 41.7 11/19/2024 0825    MPV 9.6 11/19/2024 0825    LYMPHOCYTES 22.40 11/19/2024 0825    LYMPHS 2.24 11/19/2024 0825    MONOCYTES 10.20 11/19/2024 0825    MONOS 1.02 (H) 11/19/2024 0825    EOSINOPHILS 2.30 11/19/2024 0825    EOS 0.23 11/19/2024 0825    BASOPHILS 0.80 11/19/2024 0825    BASO 0.08 11/19/2024 0825    NRBC 0.00 11/19/2024 0825       CMP   Lab Results   Component Value Date/Time    SODIUM 140 01/08/2025 1000    POTASSIUM 4.0 01/08/2025 1000    CHLORIDE 101 01/08/2025 1000    CO2 29 01/08/2025 1000    ANION 10.0 01/08/2025 1000    GLUCOSE 105 (H) 01/08/2025 1000    BUN 13 01/08/2025 1000    CREATININE 0.83 01/08/2025 1000    GFRCKD 98  01/08/2025 1000    CALCIUM 9.7 01/08/2025 1000    CORRCALC 9.1 11/19/2024 0825    ASTSGOT 15 11/19/2024 0825    ALTSGPT 18 11/19/2024 0825    ALKPHOSPHAT 86 11/19/2024 0825    TBILIRUBIN 0.5 11/19/2024 0825    ALBUMIN 4.4 11/19/2024 0825    TOTPROTEIN 8.1 11/19/2024 0825    GLOBULIN 3.7 (H) 11/19/2024 0825    AGRATIO 1.2 11/19/2024 0825       Assessment: 65 y.o.male with a history of recurrent urinary tract infections and overactive bladder. He has had normal abdominal and pelvic imaging, and normal cystourethroscopy, and I suspect his UTI's were related mostly to DM and a partially buried penis due to obesity. He is now on Mounjaro and losing weight, and his A1c is much improved, and with this he's been free of infections this year. We discussed the relationship between weight, glucose control, exercise, and bladder function, and I commended him on the improvements in his health.     His OAB is also improving with few episodes of UUI.       Plan:    -Continue vibegron 75 mg daily  -Limit bladder irritants (largely iced tea drinks in his case)  -Follow up in 1 year or sooner as needed     David Marvin MD

## 2025-05-11 RX ORDER — TIRZEPATIDE 10 MG/.5ML
10 INJECTION, SOLUTION SUBCUTANEOUS
Qty: 6 ML | Refills: 0 | Status: SHIPPED | OUTPATIENT
Start: 2025-05-11

## 2025-05-21 DIAGNOSIS — K92.1 BLOOD IN STOOL: ICD-10-CM

## 2025-05-21 DIAGNOSIS — I10 ESSENTIAL HYPERTENSION, BENIGN: ICD-10-CM

## 2025-05-21 DIAGNOSIS — E78.5 DYSLIPIDEMIA: Primary | ICD-10-CM

## 2025-05-22 ENCOUNTER — APPOINTMENT (OUTPATIENT)
Dept: MEDICAL GROUP | Facility: PHYSICIAN GROUP | Age: 65
End: 2025-05-22
Payer: COMMERCIAL

## 2025-05-30 ENCOUNTER — HOSPITAL ENCOUNTER (OUTPATIENT)
Dept: LAB | Facility: MEDICAL CENTER | Age: 65
End: 2025-05-30
Attending: FAMILY MEDICINE
Payer: COMMERCIAL

## 2025-05-30 DIAGNOSIS — I10 ESSENTIAL HYPERTENSION, BENIGN: ICD-10-CM

## 2025-05-30 DIAGNOSIS — K92.1 BLOOD IN STOOL: ICD-10-CM

## 2025-05-30 DIAGNOSIS — E78.5 DYSLIPIDEMIA: ICD-10-CM

## 2025-05-30 DIAGNOSIS — E11.9 TYPE 2 DIABETES MELLITUS WITHOUT COMPLICATION, WITHOUT LONG-TERM CURRENT USE OF INSULIN (HCC): ICD-10-CM

## 2025-05-30 LAB
ALBUMIN SERPL BCP-MCNC: 3.7 G/DL (ref 3.2–4.9)
ALBUMIN/GLOB SERPL: 1 G/DL
ALP SERPL-CCNC: 91 U/L (ref 30–99)
ALT SERPL-CCNC: 12 U/L (ref 2–50)
ANION GAP SERPL CALC-SCNC: 11 MMOL/L (ref 7–16)
AST SERPL-CCNC: 15 U/L (ref 12–45)
BASOPHILS # BLD AUTO: 0.5 % (ref 0–1.8)
BASOPHILS # BLD: 0.04 K/UL (ref 0–0.12)
BILIRUB SERPL-MCNC: 0.6 MG/DL (ref 0.1–1.5)
BUN SERPL-MCNC: 17 MG/DL (ref 8–22)
CALCIUM ALBUM COR SERPL-MCNC: 9.6 MG/DL (ref 8.5–10.5)
CALCIUM SERPL-MCNC: 9.4 MG/DL (ref 8.5–10.5)
CHLORIDE SERPL-SCNC: 103 MMOL/L (ref 96–112)
CHOLEST SERPL-MCNC: 137 MG/DL (ref 100–199)
CO2 SERPL-SCNC: 26 MMOL/L (ref 20–33)
CREAT SERPL-MCNC: 0.86 MG/DL (ref 0.5–1.4)
CREAT UR-MCNC: 152 MG/DL
EOSINOPHIL # BLD AUTO: 0.11 K/UL (ref 0–0.51)
EOSINOPHIL NFR BLD: 1.3 % (ref 0–6.9)
ERYTHROCYTE [DISTWIDTH] IN BLOOD BY AUTOMATED COUNT: 42.5 FL (ref 35.9–50)
FASTING STATUS PATIENT QL REPORTED: NORMAL
GFR SERPLBLD CREATININE-BSD FMLA CKD-EPI: 97 ML/MIN/1.73 M 2
GLOBULIN SER CALC-MCNC: 3.7 G/DL (ref 1.9–3.5)
GLUCOSE SERPL-MCNC: 98 MG/DL (ref 65–99)
HCT VFR BLD AUTO: 43.9 % (ref 42–52)
HDLC SERPL-MCNC: 43 MG/DL
HGB BLD-MCNC: 15 G/DL (ref 14–18)
IMM GRANULOCYTES # BLD AUTO: 0.03 K/UL (ref 0–0.11)
IMM GRANULOCYTES NFR BLD AUTO: 0.3 % (ref 0–0.9)
LDLC SERPL CALC-MCNC: 76 MG/DL
LYMPHOCYTES # BLD AUTO: 1.72 K/UL (ref 1–4.8)
LYMPHOCYTES NFR BLD: 20 % (ref 22–41)
MCH RBC QN AUTO: 29.9 PG (ref 27–33)
MCHC RBC AUTO-ENTMCNC: 34.2 G/DL (ref 32.3–36.5)
MCV RBC AUTO: 87.6 FL (ref 81.4–97.8)
MICROALBUMIN UR-MCNC: 3.9 MG/DL
MICROALBUMIN/CREAT UR: 26 MG/G (ref 0–30)
MONOCYTES # BLD AUTO: 0.71 K/UL (ref 0–0.85)
MONOCYTES NFR BLD AUTO: 8.3 % (ref 0–13.4)
NEUTROPHILS # BLD AUTO: 5.98 K/UL (ref 1.82–7.42)
NEUTROPHILS NFR BLD: 69.6 % (ref 44–72)
NRBC # BLD AUTO: 0 K/UL
NRBC BLD-RTO: 0 /100 WBC (ref 0–0.2)
PLATELET # BLD AUTO: 213 K/UL (ref 164–446)
PMV BLD AUTO: 9.8 FL (ref 9–12.9)
POTASSIUM SERPL-SCNC: 3.9 MMOL/L (ref 3.6–5.5)
PROT SERPL-MCNC: 7.4 G/DL (ref 6–8.2)
RBC # BLD AUTO: 5.01 M/UL (ref 4.7–6.1)
SODIUM SERPL-SCNC: 140 MMOL/L (ref 135–145)
TRIGL SERPL-MCNC: 88 MG/DL (ref 0–149)
TSH SERPL-ACNC: 2.88 UIU/ML (ref 0.38–5.33)
WBC # BLD AUTO: 8.6 K/UL (ref 4.8–10.8)

## 2025-05-30 PROCEDURE — 80061 LIPID PANEL: CPT

## 2025-05-30 PROCEDURE — 82043 UR ALBUMIN QUANTITATIVE: CPT

## 2025-05-30 PROCEDURE — 84443 ASSAY THYROID STIM HORMONE: CPT

## 2025-05-30 PROCEDURE — 36415 COLL VENOUS BLD VENIPUNCTURE: CPT

## 2025-05-30 PROCEDURE — 82570 ASSAY OF URINE CREATININE: CPT

## 2025-05-30 PROCEDURE — 85025 COMPLETE CBC W/AUTO DIFF WBC: CPT

## 2025-05-30 PROCEDURE — 80053 COMPREHEN METABOLIC PANEL: CPT

## 2025-06-04 ENCOUNTER — APPOINTMENT (OUTPATIENT)
Dept: MEDICAL GROUP | Facility: PHYSICIAN GROUP | Age: 65
End: 2025-06-04
Payer: COMMERCIAL

## 2025-06-04 VITALS
OXYGEN SATURATION: 95 % | HEIGHT: 72 IN | BODY MASS INDEX: 36.06 KG/M2 | HEART RATE: 95 BPM | DIASTOLIC BLOOD PRESSURE: 70 MMHG | SYSTOLIC BLOOD PRESSURE: 124 MMHG | WEIGHT: 266.2 LBS | RESPIRATION RATE: 18 BRPM | TEMPERATURE: 98.2 F

## 2025-06-04 DIAGNOSIS — R63.5 WEIGHT GAIN: ICD-10-CM

## 2025-06-04 DIAGNOSIS — E11.29 TYPE 2 DIABETES MELLITUS WITH MICROALBUMINURIA, WITHOUT LONG-TERM CURRENT USE OF INSULIN (HCC): Primary | ICD-10-CM

## 2025-06-04 DIAGNOSIS — E55.9 VITAMIN D DEFICIENCY: ICD-10-CM

## 2025-06-04 DIAGNOSIS — I10 ESSENTIAL HYPERTENSION, BENIGN: ICD-10-CM

## 2025-06-04 DIAGNOSIS — E03.8 OTHER SPECIFIED HYPOTHYROIDISM: ICD-10-CM

## 2025-06-04 DIAGNOSIS — R80.9 TYPE 2 DIABETES MELLITUS WITH MICROALBUMINURIA, WITHOUT LONG-TERM CURRENT USE OF INSULIN (HCC): Primary | ICD-10-CM

## 2025-06-04 PROCEDURE — 99214 OFFICE O/P EST MOD 30 MIN: CPT | Performed by: FAMILY MEDICINE

## 2025-06-04 PROCEDURE — 3078F DIAST BP <80 MM HG: CPT | Performed by: FAMILY MEDICINE

## 2025-06-04 PROCEDURE — 3074F SYST BP LT 130 MM HG: CPT | Performed by: FAMILY MEDICINE

## 2025-06-04 RX ORDER — ATORVASTATIN CALCIUM 40 MG/1
40 TABLET, FILM COATED ORAL NIGHTLY
Qty: 90 TABLET | Refills: 3 | Status: SHIPPED | OUTPATIENT
Start: 2025-06-04

## 2025-06-04 RX ORDER — CHLORTHALIDONE 25 MG/1
25 TABLET ORAL DAILY
Qty: 90 TABLET | Refills: 1 | Status: SHIPPED | OUTPATIENT
Start: 2025-06-04

## 2025-06-04 ASSESSMENT — FIBROSIS 4 INDEX: FIB4 SCORE: 1.28

## 2025-06-04 ASSESSMENT — PATIENT HEALTH QUESTIONNAIRE - PHQ9: CLINICAL INTERPRETATION OF PHQ2 SCORE: 0

## 2025-06-04 NOTE — LETTER
[email or web address]  Rosalie Colby M.D.  [address]  [place] [address]  Fax: [phone]   Authorization for Release/Disclosure of   Protected Health Information   Name: SILVIA CORDERO : 1960 SSN: [id number]   Address: [address] [place] [address] [place] [address] Phone:    There are no phone numbers on file.   I authorize the entity listed below to release/disclose the PHI below to:   [facility]/Rosalie Colby M.D. and Rosalie Colby M.D.   Provider or Entity Name: [facility]     Address    City, State, Zip  [address] [place] [address]   Phone: [phone]      Fax: [phone]     Reason for request: continuity of care   Information to be released:    [  ] LAST COLONOSCOPY,  including any PATH REPORT and follow-up  [  ] LAST FIT/COLOGUARD RESULT [  ] LAST DEXA  [  ] LAST MAMMOGRAM  [  ] LAST PAP  [  ] LAST LABS [ XXX ] RETINA EXAM REPORT  [  ] IMMUNIZATION RECORDS  [  ] Release all info      [  ] Check here and initial the line next to each item to release ALL health information INCLUDING  _____ Care and treatment for drug and / or alcohol abuse  _____ HIV testing, infection status, or AIDS  _____ Genetic Testing    DATES OF SERVICE OR TIME PERIOD TO BE DISCLOSED: _____________  I understand and acknowledge that:  * This Authorization may be revoked at any time by you in writing, except if your health information has already been used or disclosed.  * Your health information that will be used or disclosed as a result of you signing this authorization could be re-disclosed by the recipient. If this occurs, your re-disclosed health information may no longer be protected by State or Federal laws.  * You may refuse to sign this Authorization. Your refusal will not affect your ability to obtain treatment.  * This Authorization becomes effective upon signing and will  on (date) __________.      If no date is indicated, this Authorization will  one (1) year from the signature  date.    Name: Ramirez Jg Main  Signature: Date:   6/4/2025     PLEASE FAX REQUESTED RECORDS BACK TO: [phone]

## 2025-06-04 NOTE — PROGRESS NOTES
Subjective:     CC:   Chief Complaint   Patient presents with    Follow-Up       HPI:   Ramirez presents today with wife for follow-up.  Patient did also see urology and he is scheduled to follow-up with them in 1 year.  Patient has been losing weight he has been released from pharmacotherapy back to me I stressed to the patient the need to increase activity and do more walking.    Past Medical History[1]    Social History[2]    Current Medications and Prescriptions Ordered in Epic[3]    Allergies:  Patient has no known allergies.    Health Maintenance: Completed    ROS:  Gen: no fevers/chills, has lost 13 pounds in 5 months  Eyes: no changes in vision  ENT: no sore throat, no hearing loss, no bloody nose  Pulm: no sob, no cough  CV: no chest pain, no palpitations  GI: no nausea/vomiting, no diarrhea  : no dysuria  Neuro: no headaches, no numbness/tingling  Heme/Lymph: no easy bruising    Objective:     Exam:  /70 (BP Location: Left arm, Patient Position: Sitting, BP Cuff Size: Large adult)   Pulse 95   Temp 36.8 °C (98.2 °F)   Resp 18   Ht 1.829 m (6')   Wt 121 kg (266 lb 3.2 oz)   SpO2 95%   BMI 36.10 kg/m²  Body mass index is 36.1 kg/m².    Gen: Alert and oriented, No apparent distress.  Skin: Warm and dry.  No obvious lesions.  Monofilament exam patient able to feel equally in both feet  Eyes: Sclera wnl Pupils normal in size  Lungs: Normal effort, CTA bilaterally, no wheezes, rhonchi, or rales  CV: Regular rate and rhythm. No murmurs, rubs, or gallops.  Musculoskeletal: Normal gait. No extremity cyanosis, clubbing, or edema.  Neuro: Oriented to person, place and time  Psych: Mood is wnl     Assessment & Plan:     65 y.o. male with the following -     1. Essential hypertension, benign  Patient's blood pressure looks at goal patient to continue present medication  - chlorthalidone (HYGROTON) 25 MG Tab; Take 1 Tablet by mouth every day.  Dispense: 90 Tablet; Refill: 1    2. Type 2 diabetes mellitus  with microalbuminuria, without long-term current use of insulin (HCC)  M-End checking his hemoglobin A1c in October  - Comp Metabolic Panel; Future  - HEMOGLOBIN A1C; Future    3. Other specified hypothyroidism  Patient's thyroid is in good range continue present thyroid medication    4. Vitamin D deficiency  Recommend checking vitamin D next time  - VITAMIN D,25 HYDROXY (DEFICIENCY); Future    5. Weight gain  Patient has lost weight but encourage patient to increase physical activity    Other orders  - atorvastatin (LIPITOR) 40 MG Tab; Take 1 Tablet by mouth every evening.  Dispense: 90 Tablet; Refill: 3       Return in about 4 months (around 10/4/2025), or if symptoms worsen or fail to improve.    Please note that this dictation was created using voice recognition software. I have made every reasonable attempt to correct obvious errors, but I expect that there are errors of grammar and possibly content that I did not discover before finalizing the note.         [1]   Past Medical History:  Diagnosis Date    Diabetes     Hyperlipidemia     Hypertension    [2]   Social History  Tobacco Use    Smoking status: Former     Current packs/day: 0.00     Types: Cigarettes     Quit date: 1980     Years since quittin.5    Smokeless tobacco: Former     Types: Chew     Quit date: 1980    Tobacco comments:     NOT REALLY SURE WHAT YEAR HE QUIT SMOKING & CHEW   Vaping Use    Vaping status: Never Used   Substance Use Topics    Alcohol use: No    Drug use: Yes     Types: Marijuana     Comment: now and then   [3]   Current Outpatient Medications Ordered in Epic   Medication Sig Dispense Refill    atorvastatin (LIPITOR) 40 MG Tab Take 1 Tablet by mouth every evening. 90 Tablet 3    chlorthalidone (HYGROTON) 25 MG Tab Take 1 Tablet by mouth every day. 90 Tablet 1    Tirzepatide (MOUNJARO) 10 MG/0.5ML Solution Auto-injector Inject 10 mg under the skin every 7 days. 6 mL 0    vibegron (GEMTESA) 75 MG tablet Take 1 Tablet  by mouth every day. 30 Tablet 11    esomeprazole (NEXIUM) 40 MG delayed-release capsule TAKE 1 CAPSULE EVERY MORNING BEFORE BREAKFAST 90 Capsule 1    levothyroxine (SYNTHROID) 25 MCG Tab TAKE 1 TABLET EVERY MORNING ON AN EMPTY STOMACH 90 Tablet 3    fluoxetine (PROZAC) 40 MG capsule Take 1 Capsule by mouth every day. 90 Capsule 3    Blood Glucose Monitoring Suppl (ONE TOUCH ULTRA 2) w/Device Kit Use to test blood glucose one to two times daily 1 Kit 0    glucose blood (ONETOUCH ULTRA) strip Use to test blood glucose one to two times daily 100 Strip 4    ONE TOUCH ULTRASOFT LANCETS Misc Use to test blood glucose one to two times daily 100 Each 4    lisinopril (PRINIVIL) 10 MG Tab Take 2 Tablets by mouth every day. 90 Tablet 3     No current Epic-ordered facility-administered medications on file.

## 2025-08-08 RX ORDER — TIRZEPATIDE 10 MG/.5ML
10 INJECTION, SOLUTION SUBCUTANEOUS
Qty: 6 ML | Refills: 2 | Status: SHIPPED | OUTPATIENT
Start: 2025-08-08